# Patient Record
Sex: FEMALE | Race: WHITE | NOT HISPANIC OR LATINO | Employment: UNEMPLOYED | URBAN - METROPOLITAN AREA
[De-identification: names, ages, dates, MRNs, and addresses within clinical notes are randomized per-mention and may not be internally consistent; named-entity substitution may affect disease eponyms.]

---

## 2022-11-09 ENCOUNTER — HOSPITAL ENCOUNTER (EMERGENCY)
Facility: HOSPITAL | Age: 47
Discharge: HOME | End: 2022-11-09
Attending: EMERGENCY MEDICINE
Payer: MEDICAID

## 2022-11-09 VITALS
RESPIRATION RATE: 18 BRPM | WEIGHT: 121.38 LBS | BODY MASS INDEX: 20.22 KG/M2 | HEIGHT: 65 IN | OXYGEN SATURATION: 100 % | SYSTOLIC BLOOD PRESSURE: 136 MMHG | TEMPERATURE: 98 F | DIASTOLIC BLOOD PRESSURE: 90 MMHG | HEART RATE: 88 BPM

## 2022-11-09 DIAGNOSIS — N70.11 HYDROSALPINX: ICD-10-CM

## 2022-11-09 DIAGNOSIS — R63.4 WEIGHT LOSS, ABNORMAL: ICD-10-CM

## 2022-11-09 DIAGNOSIS — R63.4 WEIGHT LOSS, UNINTENTIONAL: ICD-10-CM

## 2022-11-09 DIAGNOSIS — R73.9 HYPERGLYCEMIA: ICD-10-CM

## 2022-11-09 DIAGNOSIS — E11.69 TYPE 2 DIABETES MELLITUS WITH OTHER SPECIFIED COMPLICATION, WITHOUT LONG-TERM CURRENT USE OF INSULIN: ICD-10-CM

## 2022-11-09 DIAGNOSIS — Z91.148 NONCOMPLIANCE WITH MEDICATION REGIMEN: ICD-10-CM

## 2022-11-09 DIAGNOSIS — I10 HYPERTENSION, UNSPECIFIED TYPE: ICD-10-CM

## 2022-11-09 DIAGNOSIS — R63.4 WEIGHT LOSS: Primary | ICD-10-CM

## 2022-11-09 LAB
ALBUMIN SERPL-MCNC: 4 GM/DL (ref 3.5–5)
ALBUMIN/GLOB SERPL: 1.1 RATIO (ref 1.1–2)
ALP SERPL-CCNC: 88 UNIT/L (ref 40–150)
ALT SERPL-CCNC: 19 UNIT/L (ref 0–55)
AMPHET UR QL SCN: POSITIVE
APPEARANCE UR: CLEAR
APTT PPP: 25.4 SECONDS (ref 23.2–33.7)
AST SERPL-CCNC: 16 UNIT/L (ref 5–34)
BARBITURATE SCN PRESENT UR: NEGATIVE
BASOPHILS # BLD AUTO: 0.07 X10(3)/MCL (ref 0–0.2)
BASOPHILS NFR BLD AUTO: 0.9 %
BENZODIAZ UR QL SCN: NEGATIVE
BILIRUB UR QL STRIP.AUTO: NEGATIVE MG/DL
BILIRUBIN DIRECT+TOT PNL SERPL-MCNC: 1 MG/DL
BUN SERPL-MCNC: 17 MG/DL (ref 7–18.7)
CALCIUM SERPL-MCNC: 9.8 MG/DL (ref 8.4–10.2)
CANNABINOIDS UR QL SCN: NEGATIVE
CHLORIDE SERPL-SCNC: 97 MMOL/L (ref 98–107)
CO2 SERPL-SCNC: 29 MMOL/L (ref 22–29)
COCAINE UR QL SCN: NEGATIVE
COLOR UR AUTO: YELLOW
CREAT SERPL-MCNC: 0.8 MG/DL (ref 0.55–1.02)
EOSINOPHIL # BLD AUTO: 0.25 X10(3)/MCL (ref 0–0.9)
EOSINOPHIL NFR BLD AUTO: 3.2 %
ERYTHROCYTE [DISTWIDTH] IN BLOOD BY AUTOMATED COUNT: 11.9 % (ref 11.5–17)
ETHANOL SERPL-MCNC: <10 MG/DL
FENTANYL UR QL SCN: NEGATIVE
GFR SERPLBLD CREATININE-BSD FMLA CKD-EPI: >60 MLS/MIN/1.73/M2
GLOBULIN SER-MCNC: 3.5 GM/DL (ref 2.4–3.5)
GLUCOSE SERPL-MCNC: 326 MG/DL (ref 74–100)
GLUCOSE UR QL STRIP.AUTO: >=1000 MG/DL
HCT VFR BLD AUTO: 39.2 % (ref 37–47)
HGB BLD-MCNC: 13.3 GM/DL (ref 12–16)
IMM GRANULOCYTES # BLD AUTO: 0.01 X10(3)/MCL (ref 0–0.04)
IMM GRANULOCYTES NFR BLD AUTO: 0.1 %
INR BLD: 0.95 (ref 0–1.3)
KETONES UR QL STRIP.AUTO: NEGATIVE MG/DL
LACTATE SERPL-SCNC: 1.8 MMOL/L (ref 0.5–2.2)
LEUKOCYTE ESTERASE UR QL STRIP.AUTO: NEGATIVE UNIT/L
LIPASE SERPL-CCNC: 19 U/L
LYMPHOCYTES # BLD AUTO: 2.51 X10(3)/MCL (ref 0.6–4.6)
LYMPHOCYTES NFR BLD AUTO: 31.7 %
MAGNESIUM SERPL-MCNC: 1.6 MG/DL (ref 1.6–2.6)
MCH RBC QN AUTO: 29.4 PG (ref 27–31)
MCHC RBC AUTO-ENTMCNC: 33.9 MG/DL (ref 33–36)
MCV RBC AUTO: 86.5 FL (ref 80–94)
MDMA UR QL SCN: NEGATIVE
MONOCYTES # BLD AUTO: 0.86 X10(3)/MCL (ref 0.1–1.3)
MONOCYTES NFR BLD AUTO: 10.9 %
NEUTROPHILS # BLD AUTO: 4.2 X10(3)/MCL (ref 2.1–9.2)
NEUTROPHILS NFR BLD AUTO: 53.2 %
NITRITE UR QL STRIP.AUTO: NEGATIVE
OPIATES UR QL SCN: NEGATIVE
PCP UR QL: NEGATIVE
PH UR STRIP.AUTO: 6.5 [PH]
PH UR: 6.5 [PH] (ref 3–11)
PLATELET # BLD AUTO: 298 X10(3)/MCL (ref 130–400)
PMV BLD AUTO: 11 FL (ref 7.4–10.4)
POTASSIUM SERPL-SCNC: 4 MMOL/L (ref 3.5–5.1)
PROT SERPL-MCNC: 7.5 GM/DL (ref 6.4–8.3)
PROT UR QL STRIP.AUTO: NEGATIVE MG/DL
PROTHROMBIN TIME: 12.6 SECONDS (ref 12.5–14.5)
RBC # BLD AUTO: 4.53 X10(6)/MCL (ref 4.2–5.4)
RBC UR QL AUTO: NEGATIVE UNIT/L
SODIUM SERPL-SCNC: 133 MMOL/L (ref 136–145)
SP GR UR STRIP.AUTO: 1.02
SPECIFIC GRAVITY, URINE AUTO (.000) (OHS): 1.02 (ref 1–1.03)
TSH SERPL-ACNC: 0.87 UIU/ML (ref 0.35–4.94)
UROBILINOGEN UR STRIP-ACNC: 1 MG/DL
WBC # SPEC AUTO: 7.9 X10(3)/MCL (ref 4.5–11.5)

## 2022-11-09 PROCEDURE — 85610 PROTHROMBIN TIME: CPT | Performed by: EMERGENCY MEDICINE

## 2022-11-09 PROCEDURE — 93005 ELECTROCARDIOGRAM TRACING: CPT

## 2022-11-09 PROCEDURE — 82077 ASSAY SPEC XCP UR&BREATH IA: CPT | Performed by: EMERGENCY MEDICINE

## 2022-11-09 PROCEDURE — 96361 HYDRATE IV INFUSION ADD-ON: CPT

## 2022-11-09 PROCEDURE — 81003 URINALYSIS AUTO W/O SCOPE: CPT | Mod: 59 | Performed by: EMERGENCY MEDICINE

## 2022-11-09 PROCEDURE — 93010 EKG 12-LEAD: ICD-10-PCS | Mod: ,,, | Performed by: INTERNAL MEDICINE

## 2022-11-09 PROCEDURE — 84443 ASSAY THYROID STIM HORMONE: CPT | Performed by: EMERGENCY MEDICINE

## 2022-11-09 PROCEDURE — 80307 DRUG TEST PRSMV CHEM ANLYZR: CPT | Performed by: EMERGENCY MEDICINE

## 2022-11-09 PROCEDURE — 83735 ASSAY OF MAGNESIUM: CPT | Performed by: EMERGENCY MEDICINE

## 2022-11-09 PROCEDURE — 80053 COMPREHEN METABOLIC PANEL: CPT | Performed by: EMERGENCY MEDICINE

## 2022-11-09 PROCEDURE — 25500020 PHARM REV CODE 255: Performed by: EMERGENCY MEDICINE

## 2022-11-09 PROCEDURE — 83605 ASSAY OF LACTIC ACID: CPT | Performed by: EMERGENCY MEDICINE

## 2022-11-09 PROCEDURE — 99285 EMERGENCY DEPT VISIT HI MDM: CPT | Mod: 25

## 2022-11-09 PROCEDURE — 85025 COMPLETE CBC W/AUTO DIFF WBC: CPT | Performed by: EMERGENCY MEDICINE

## 2022-11-09 PROCEDURE — 83690 ASSAY OF LIPASE: CPT | Performed by: EMERGENCY MEDICINE

## 2022-11-09 PROCEDURE — 87040 BLOOD CULTURE FOR BACTERIA: CPT | Performed by: EMERGENCY MEDICINE

## 2022-11-09 PROCEDURE — 85730 THROMBOPLASTIN TIME PARTIAL: CPT | Performed by: EMERGENCY MEDICINE

## 2022-11-09 PROCEDURE — 93010 ELECTROCARDIOGRAM REPORT: CPT | Mod: ,,, | Performed by: INTERNAL MEDICINE

## 2022-11-09 PROCEDURE — 25000003 PHARM REV CODE 250: Performed by: EMERGENCY MEDICINE

## 2022-11-09 PROCEDURE — 96374 THER/PROPH/DIAG INJ IV PUSH: CPT

## 2022-11-09 RX ORDER — SODIUM CHLORIDE 9 MG/ML
125 INJECTION, SOLUTION INTRAVENOUS ONCE
Status: COMPLETED | OUTPATIENT
Start: 2022-11-09 | End: 2022-11-09

## 2022-11-09 RX ORDER — LISINOPRIL 10 MG/1
10 TABLET ORAL DAILY
Qty: 30 TABLET | Refills: 0 | Status: SHIPPED | OUTPATIENT
Start: 2022-11-09 | End: 2022-12-09

## 2022-11-09 RX ORDER — LABETALOL HYDROCHLORIDE 5 MG/ML
20 INJECTION, SOLUTION INTRAVENOUS
Status: COMPLETED | OUTPATIENT
Start: 2022-11-09 | End: 2022-11-09

## 2022-11-09 RX ORDER — METFORMIN HYDROCHLORIDE 500 MG/1
500 TABLET ORAL 2 TIMES DAILY WITH MEALS
Qty: 60 TABLET | Refills: 0 | Status: SHIPPED | OUTPATIENT
Start: 2022-11-09 | End: 2022-12-09

## 2022-11-09 RX ORDER — GABAPENTIN 300 MG/1
300 CAPSULE ORAL 3 TIMES DAILY
Qty: 90 CAPSULE | Refills: 0 | Status: SHIPPED | OUTPATIENT
Start: 2022-11-09 | End: 2022-12-09

## 2022-11-09 RX ADMIN — IOPAMIDOL 100 ML: 755 INJECTION, SOLUTION INTRAVENOUS at 10:11

## 2022-11-09 RX ADMIN — SODIUM CHLORIDE 125 ML/HR: 9 INJECTION, SOLUTION INTRAVENOUS at 09:11

## 2022-11-09 RX ADMIN — LABETALOL HYDROCHLORIDE 20 MG: 5 INJECTION, SOLUTION INTRAVENOUS at 01:11

## 2022-11-09 NOTE — DISCHARGE INSTRUCTIONS
Please find primary healthcare source.  Re-evaluation before your month worth of medication runs out    Stop smoking    Return for any emergency issue

## 2022-11-09 NOTE — ED PROVIDER NOTES
Encounter Date: 2022       History     Chief Complaint   Patient presents with    Weight Loss     C.o weight loss, lump to epigastric area, left hand and right groin. States had not been to a dr because she is scared to find out what is wrong. Also c/o back/rib pain for 3 weeks with no injury     Is a 47-year-old female who presents the emergency department complaining of massive weight loss over the past year.  She tells us used to weigh 170 lb and then when she weighed 2 weeks ago she was down to 89 lb.  She is a long-time smoker.  She does not drink alcohol she done use drugs.  She has been diagnosed in the past with the need for hormone replacement very therapy hypercholesterolemia they need to be treated hypertension as well as diabetes mellitus.  She has peripheral neuropathy.  About 3 years ago she moved could not find a doctor so stop taking all of her medications.  She arrives the emergency department today because she is having back pain abdominal pain and she is very concerned now about this and massive weight loss.      Past medical history hypertension diabetes on hormone replacement therapy hypercholesterolemia.  Peripheral neuropathy secondary to diabetes.    Vaccinations no COVID no flu no tetanus in the last 5 years no pneumonia shots.      Past surgical history right wrist fracture  x2 tonsillectomy adenoidectomy right foot surgery hysterectomy surgery.  She is a  2 para 2 status post hysterectomy she has no primary healthcare provider currently unemployed.  She is  lives with her .  Her sister brings to the emergency department today mom is alive she has hypertension and kidney issues.  Dad is alive with strokes diabetes and heart disease.  Patient has no known drug allergies    Patient complains of a pain and a mass that she feels at her xiphoid process area that radiates all the way around both ribs and ends up in the middle portion of her back.  Not associated  with fever chills reproducible by pressing on it.  Complains severe pain the middle portion of her back denies any incontinence of bowel or bladder denies any new neuropathy but has bad pain in both legs    Review of patient's allergies indicates:  No Known Allergies  Past Medical History:   Diagnosis Date    Depression     Diabetes mellitus     Hypertension     Mixed hyperlipidemia     Neuropathy      Past Surgical History:   Procedure Laterality Date     SECTION      X2    FOOT SURGERY Left     HYSTERECTOMY      TONSILLECTOMY      WRIST SURGERY Right      History reviewed. No pertinent family history.  Social History     Tobacco Use    Smoking status: Every Day     Packs/day: 1.00     Types: Cigarettes    Smokeless tobacco: Never   Substance Use Topics    Alcohol use: Not Currently    Drug use: Never     Review of Systems   Constitutional:  Positive for appetite change and unexpected weight change (A year ago she said she weighed 170 lb 2 weeks ago she said she weight 89 lb actual weight today in our  lb). Negative for fever.   HENT:  Positive for congestion and drooling. Negative for sore throat.    Eyes:  Positive for discharge.   Respiratory:  Positive for chest tightness. Negative for shortness of breath.    Cardiovascular:  Negative for chest pain.   Gastrointestinal:  Positive for abdominal pain. Negative for nausea, rectal pain and vomiting.   Endocrine: Negative.    Genitourinary:  Negative for difficulty urinating and dysuria.   Musculoskeletal:  Positive for back pain.   Skin:  Negative for rash.   Allergic/Immunologic: Negative.    Neurological: Negative.  Negative for weakness.   Hematological:  Does not bruise/bleed easily.   Psychiatric/Behavioral: Negative.       Physical Exam     Initial Vitals [22 0826]   BP Pulse Resp Temp SpO2   (!) 133/90 (!) 119 20 98.2 °F (36.8 °C) 100 %      MAP       --         Physical Exam    Nursing note and vitals reviewed.  Constitutional: She  appears well-developed and well-nourished.   HENT:   Head: Normocephalic and atraumatic.   Right Ear: Tympanic membrane and external ear normal.   Left Ear: Tympanic membrane and external ear normal.   Nose: Nose normal.   Mouth/Throat: Oropharynx is clear and moist and mucous membranes are normal.   Eyes: EOM are normal. Pupils are equal, round, and reactive to light.   Neck: Neck supple. No thyromegaly present. No tracheal deviation present. No JVD present.   Normal range of motion.  Cardiovascular:  Normal rate, regular rhythm and normal heart sounds.     Exam reveals no gallop and no friction rub.       No murmur heard.  Pulmonary/Chest: Breath sounds normal. No stridor. No respiratory distress. She has no wheezes. She has no rhonchi. She has no rales. She exhibits no tenderness.   Abdominal: Abdomen is soft. Bowel sounds are normal. She exhibits no distension and no mass. There is no abdominal tenderness.   I do not feel any discrete masses in her abdomen.  There is some shotty inguinal adenopathy   Genitourinary:    Genitourinary Comments: No CVA tenderness     Musculoskeletal:         General: No tenderness or edema. Normal range of motion.      Cervical back: Normal range of motion and neck supple.     Lymphadenopathy:     She has no cervical adenopathy.   Neurological: She is alert and oriented to person, place, and time. She has normal strength and normal reflexes. No cranial nerve deficit. GCS score is 15. GCS eye subscore is 4. GCS verbal subscore is 5. GCS motor subscore is 6.   Skin: Skin is warm and dry. Capillary refill takes 2 to 3 seconds. No rash noted.   Psychiatric: She has a normal mood and affect. Her behavior is normal. Judgment and thought content normal.       ED Course   Procedures  Labs Reviewed   COMPREHENSIVE METABOLIC PANEL - Abnormal; Notable for the following components:       Result Value    Sodium Level 133 (*)     Chloride 97 (*)     Glucose Level 326 (*)     All other components  within normal limits   URINALYSIS, REFLEX TO URINE CULTURE - Abnormal; Notable for the following components:    Glucose, UA >=1000 (*)     All other components within normal limits   DRUG SCREEN, URINE (BEAKER) - Abnormal; Notable for the following components:    Amphetamines, Urine Positive (*)     All other components within normal limits    Narrative:     Cut off concentrations:    Amphetamines - 1000 ng/ml  Barbiturates - 200 ng/ml  Benzodiazepine - 200 ng/ml  Cannabinoids (THC) - 50 ng/ml  Cocaine - 300 ng/ml  Fentanyl - 1.0 ng/ml  MDMA - 500 ng/ml  Opiates - 300 ng/ml   Phencyclidine (PCP) - 25 ng/ml    Specimen submitted for drug analysis and tested for pH and specific gravity in order to evaluate sample integrity. Suspect tampering if specific gravity is <1.003 and/or pH is not within the range of 4.5 - 8.0  False negatives may result form substances such as bleach added to urine.  False positives may result for the presence of a substance with similar chemical structure to the drug or its metabolite.    This test provides only a PRELIMINARY analytical test result. A more specific alternate chemical method must be used in order to obtain a confirmed analytical result. Gas chromatography/mass spectrometry (GC/MS) is the preferred confirmatory method. Other chemical confirmation methods are available. Clinical consideration and professional judgement should be applied to any drug of abuse test result, particularly when preliminary positive results are used.    Positive results will be confirmed only at the physicians request. Unconfirmed screening results are to be used only for medical purposes (treatment).        CBC WITH DIFFERENTIAL - Abnormal; Notable for the following components:    MPV 11.0 (*)     All other components within normal limits   ALCOHOL,MEDICAL (ETHANOL) - Normal   LACTIC ACID, PLASMA - Normal   PROTIME-INR - Normal   APTT - Normal   TSH - Normal   LIPASE - Normal   MAGNESIUM - Normal    BLOOD CULTURE OLG   BLOOD CULTURE [facility]LG   CBC W/ AUTO DIFFERENTIAL    Narrative:     The following orders were created for panel order CBC auto differential.  Procedure                               Abnormality         Status                     ---------                               -----------         ------                     CBC with Differential[[id number]]        Abnormal            Final result                 Please view results for these tests on the individual orders.     Recent Results (from the past 8 hour(s))   Comprehensive metabolic panel    Collection Time: 11/09/22  8:50 AM   Result Value Ref Range    Sodium Level 133 (L) 136 - 145 mmol/L    Potassium Level 4.0 3.5 - 5.1 mmol/L    Chloride 97 (L) 98 - 107 mmol/L    Carbon Dioxide 29 22 - 29 mmol/L    Glucose Level 326 (H) 74 - 100 mg/dL    Blood Urea Nitrogen 17.0 7.0 - 18.7 mg/dL    Creatinine 0.80 0.55 - 1.02 mg/dL    Calcium Level Total 9.8 8.4 - 10.2 mg/dL    Protein Total 7.5 6.4 - 8.3 gm/dL    Albumin Level 4.0 3.5 - 5.0 gm/dL    Globulin 3.5 2.4 - 3.5 gm/dL    Albumin/Globulin Ratio 1.1 1.1 - 2.0 ratio    Bilirubin Total 1.0 <=1.5 mg/dL    Alkaline Phosphatase 88 40 - 150 unit/L    Alanine Aminotransferase 19 0 - 55 unit/L    Aspartate Aminotransferase 16 5 - 34 unit/L    eGFR >60 mls/min/1.73/m2   Ethanol    Collection Time: 11/09/22  8:50 AM   Result Value Ref Range    Ethanol Level <10.0 <=10.0 mg/dL   Lactic acid, plasma    Collection Time: 11/09/22  8:50 AM   Result Value Ref Range    Lactic Acid Level 1.8 0.5 - 2.2 mmol/L   Protime-INR    Collection Time: 11/09/22  8:50 AM   Result Value Ref Range    PT 12.6 12.5 - 14.5 seconds    INR 0.95 0.00 - 1.30   APTT    Collection Time: 11/09/22  8:50 AM   Result Value Ref Range    PTT 25.4 23.2 - 33.7 seconds   TSH    Collection Time: 11/09/22  8:50 AM   Result Value Ref Range    Thyroid Stimulating Hormone 0.8711 0.3500 - 4.9400 uIU/mL   Lipase    Collection Time: 11/09/22  8:50 AM   Result  Value Ref Range    Lipase Level 19 <=60 U/L   Magnesium    Collection Time: 11/09/22  8:50 AM   Result Value Ref Range    Magnesium Level 1.60 1.60 - 2.60 mg/dL   CBC with Differential    Collection Time: 11/09/22  8:50 AM   Result Value Ref Range    WBC 7.9 4.5 - 11.5 x10(3)/mcL    RBC 4.53 4.20 - 5.40 x10(6)/mcL    Hgb 13.3 12.0 - 16.0 gm/dL    Hct 39.2 37.0 - 47.0 %    MCV 86.5 80.0 - 94.0 fL    MCH 29.4 27.0 - 31.0 pg    MCHC 33.9 33.0 - 36.0 mg/dL    RDW 11.9 11.5 - 17.0 %    Platelet 298 130 - 400 x10(3)/mcL    MPV 11.0 (H) 7.4 - 10.4 fL    Neut % 53.2 %    Lymph % 31.7 %    Mono % 10.9 %    Eos % 3.2 %    Basophil % 0.9 %    Lymph # 2.51 0.6 - 4.6 x10(3)/mcL    Neut # 4.2 2.1 - 9.2 x10(3)/mcL    Mono # 0.86 0.1 - 1.3 x10(3)/mcL    Eos # 0.25 0 - 0.9 x10(3)/mcL    Baso # 0.07 0 - 0.2 x10(3)/mcL    IG# 0.01 0 - 0.04 x10(3)/mcL    IG% 0.1 %   Urinalysis, Reflex to Urine Culture Urine, Clean Catch    Collection Time: 11/09/22 10:14 AM    Specimen: Urine   Result Value Ref Range    Color, UA Yellow Yellow, Light-Yellow, Dark Yellow, Silvina, Straw    Appearance, UA Clear Clear    Specific Gravity, UA 1.020     pH, UA 6.5 5.0 - 8.5    Protein, UA Negative Negative mg/dL    Glucose, UA >=1000 (A) Negative, Normal mg/dL    Ketones, UA Negative Negative mg/dL    Blood, UA Negative Negative unit/L    Bilirubin, UA Negative Negative mg/dL    Urobilinogen, UA 1.0 0.2, 1.0, Normal mg/dL    Nitrites, UA Negative Negative    Leukocyte Esterase, UA Negative Negative unit/L   Drug Screen panel, emergency    Collection Time: 11/09/22 10:14 AM   Result Value Ref Range    Amphetamines, Urine Positive (A) Negative    Barbituates, Urine Negative Negative    Benzodiazepine, Urine Negative Negative    Cannabinoids, Urine Negative Negative    Cocaine, Urine Negative Negative    Fentanyl, Urine Negative Negative    MDMA, Urine Negative Negative    Opiates, Urine Negative Negative    Phencyclidine, Urine Negative Negative    pH, Urine  6.5 3.0 - 11.0    Specific Gravity, Urine Auto 1.020 1.001 - 1.035       EKG Readings: (Independently Interpreted)   Initial Reading: No STEMI. Rhythm: Sinus Tachycardia. Heart Rate: 114.   0843. 9 November 2022.  Heart rate 114 sinus tachycardia biatrial atrial enlargement aside from the tachycardia fairly benign normal appearing ECG   ECG Results              EKG 12-lead (Final result)  Result time 11/09/22 12:11:05      Final result by Interface, Lab In [facility] (11/09/22 12:11:05)                   Narrative:    Test Reason : R63.4,    Vent. Rate : 114 BPM     Atrial Rate : 114 BPM     P-R Int : 118 ms          QRS Dur : 076 ms      QT Int : 324 ms       P-R-T Axes : 071 048 079 degrees     QTc Int : 446 ms    Sinus tachycardia  Biatrial enlargement  Abnormal ECG  No previous ECGs available  Confirmed by Shaun Dela Cruz MD ([id number]) on 11/9/2022 12:10:58 PM    Referred By: [email or web address]   SELF           Confirmed By:Shaun Dela Cruz MD                                  Imaging Results              US Pelvis Complete Non OB (Final result)  Result time 11/09/22 14:20:31   Procedure changed from US Transvaginal Non OB     Final result by Kvng Curry MD (11/09/22 14:20:31)                   Impression:      1. Status post hysterectomy.  Previous suspect uterus seen on the CT exam of the same day likely represents a prominent residual cervical stump.  2. Tubular cystic 4.2 x 1.3 x 2.4 cm focus superior to the right ovary suspicious for a hydro or pyosalpinx      Electronically signed by: Kvng Curry  Date:    11/09/2022  Time:    14:20               Narrative:    EXAMINATION:  ULTRASOUND PELVIS NON OB COMPLETE:    CLINICAL HISTORY:  Chronic salpingitis, hydrosalpinx;    COMPARISON:  None available    FINDINGS:  Transabdominal  scanning was performed. Multiple sonographic images reveal the uterus to be surgically absent.  The right ovary measures 2.8 x 2.2 x 2.4 cm and demonstrates venous flow.  The left ovary measures 3.0  x 1.9 x 2.2 cm and demonstrates venous flow a tubular cystic like focus is noted superior to the right ovary measuring 4.2 x 1.3 x 2.4 cm of uncertain etiology.  This may represent a hydro or pyosalpinx..  No free fluid is seen within the cul-de-sac. No abnormal adnexal mass is seen.                                       CT Chest Abdomen Pelvis With Contrast (xpd) (Final result)  Result time 11/09/22 11:51:22      Final result by Kvng Curry MD (11/09/22 11:51:22)                   Impression:      1. 3 mm subpleural nodule anterior right middle lobe and 2 mm pleural base nodule lateral left lung base.  Follow-up per Fleischner criteria  2. Findings compatible with a 1 cm simple cyst at the mid left kidney  3. Somewhat serpiginous tubular fluid density in the right adnexa measuring up to 1 cm in diameter and 5 cm in length suspicious for a hydrosalpinx.  A pelvic sonogram would allow further evaluation  4. A few blebs of gas present within the bladder and in the vaginal cavity.  Clinical correlation is indicated  5. 2 cm nodule left adrenal gland.  MR examination would allow further evaluation.  6. Mild thoracolumbar spondylosis with 5 mm sclerotic focus at T7  7. A few mildly prominent ill-defined lymph nodes measuring 1 cm seen within the periportal/pericaval region.      Electronically signed by: Kvng Curry  Date:    11/09/2022  Time:    11:51               Narrative:    EXAMINATION:  CT CHEST ABDOMEN PELVIS WITH CONTRAST (XPD)    CLINICAL HISTORY:  Weight loss, unintended;, .    TECHNIQUE:  PATIENT RADIATION DOSE: DLP(mGycm) 353    As per PQRS measures, all CT scans at this facility used dose modulation, iterative reconstruction, and/or weight based dose adjustment when appropriate to reduce radiation dose to as low as reasonably achievable.    COMPARISON:  None available    FINDINGS:  Serial axial images were obtained of the chest abdomen pelviswith the administration of IV contrast.  Additional  sagittal and coronal reconstructions were performed. Mild degenerative changes are noted to the thoracolumbar spine.  A small round sclerotic focus is evident at T7 measuring 5 mm in diameter.  Visualized thyroid lobes are relatively symmetric in size.  No mediastinal or axillary lymphadenopathy is seen.  The heart is normal in size.  The airways are grossly patent.  The lungs are relatively clear and well aerated.  No infiltrate or effusion is seen.  A 3 mm subpleural nodule is evident at the anterolateral right middle lobe.  A 2 mm pleural based nodule is evident at the lateral left lung base.  The liver, gallbladder, spleen, right adrenal gland, and pancreas are grossly within normal limits.  Atherosclerosis is seen within the aorta and branching vessels.  The stomach is partially distended with fluid and particulate food matter.  A likely splenic accessory nodule measuring just over 1 cm is seen at the splenic hilum.  There is nodular thickening at the left adrenal gland measuring up to 2 cm in diameter.  The kidneys are relatively symmetric in size.  No hydronephrosis is seen.  A small round low-attenuation focus/suspect cyst measuring 1 cm is noted to the posterior mid left kidney.  A few mildly prominent ill-defined lymph nodes seen within the pericaval/periportal region measuring just over 1 cm.  No dilated loops of bowel are identified.  No free fluid collection is seen.  Feces is scattered throughout the colon.  The appendix is not identified with certainty.  Evaluation is limited.  The bladder is distended with fluid.  A few blebs of gas are seen within the anterior bladder.  There is a small amount of gas present within the vaginal cavity.  The uterus is normal in size.  There is mild ill-defined soft tissue fullness at the cervical vaginal region.  A somewhat serpiginous tubular fluid density is seen at the right adnexa measuring up to 1 cm in diameter and up to 5 cm in length.                                        X-Ray Chest PA And Lateral (Final result)  Result time 11/09/22 10:18:38      Final result by Kvng Curry MD (11/09/22 10:18:38)                   Impression:      1. No active cardiopulmonary disease identified      Electronically signed by: Kvng Curry  Date:    11/09/2022  Time:    10:18               Narrative:    EXAMINATION:  XR CHEST PA AND LATERAL    CLINICAL HISTORY:  Chest Pain;, .    COMPARISON:  05/09/2019    FINDINGS:  PA/AP and lateral views reveal heart to be normal in size.  The trachea is midline.  No infiltrate or effusion is seen.  Bony structures appear grossly intact.  No pneumothorax is identified.                                       Medications   0.9%  NaCl infusion (0 mL/hr Intravenous Stopped 11/9/22 1445)   iopamidoL (ISOVUE-370) injection 100 mL (100 mLs Intravenous Given 11/9/22 1056)   labetaloL injection 20 mg (20 mg Intravenous Given 11/9/22 1338)     Medical Decision Making:   Initial Assessment:   Unexplained weight loss in a smoker certainly occult malignancy or undiagnosed malignancy would be highly likely.  Also noncompliant with medications for 3 years diabetes could of taking his told also.  Patient says that her weight was 170 and she went down to 89 last weight 2 or 3 weeks ago she states but here she weighed 121 lb.  Differential Diagnosis:   Cancer, untreated diabetes and hypertension, unexplained weight loss patient does suffer with chronic neuropathy significant depression can cause this also           ED Course as of 11/09/22 1525   Wed Nov 09, 2022   1236 With her sister in the room with her permission we discussed all of her results drug screen was positive for amphetamines in the urine but she denies using Ritalin or taking prescription Ritalin she denies any methamphetamine use.  I explained the about the hydrosalpinx.  The need for the ultrasound.  I also talked to them about how the CT scan chest abdomen pelvis did not disclose any obvious  cancerous looking lesions.  She asked that me she does not neck cancer said I cannot guarantee that we did not find any evidence of any cancer on your scans.    There was also a big discrepancy between what she said she lost and the documented weight loss she says that a year ago she weighed 170 lb and she said 2 weeks ago she was 98 lb our weight here on a scale was 121. [DM]   1356 Neut #: 4.2 [DM]   1426 I explained to the patient that in the right side she does have that fluid in what appears to be the fallopian tube she does not have an elevated white count does not appear to be a gross infection clinically at this point her tenderness is more on the left side of her abdomen I believe she can follow up with a doctor regarding this and maybe even get referred to a gyn doctor    Also explained to her we will put her back on some of her medications lisinopril for her blood pressure.  As well as metformin for her diabetes.  We will give her gabapentin for her neuropathy. [DM]      ED Course User Index  [DM] Kvng Pacheco MD                 Clinical Impression:   Final diagnoses:  [R63.4] Weight loss, abnormal  [N70.11] Hydrosalpinx  [R63.4] Weight loss (Primary)  [R73.9] Hyperglycemia  [R63.4] Weight loss, unintentional  [E11.69] Type 2 diabetes mellitus with other specified complication, without long-term current use of insulin  [I10] Hypertension, unspecified type  [Z91.14] Noncompliance with medication regimen        ED Disposition Condition    Discharge Stable          ED Prescriptions       Medication Sig Dispense Start Date End Date Auth. Provider    gabapentin (NEURONTIN) 300 MG capsule Take 1 capsule (300 mg total) by mouth 3 (three) times daily. 90 capsule 11/9/2022 12/9/2022 Kvng Pacheco MD    metFORMIN (GLUCOPHAGE) 500 MG tablet Take 1 tablet (500 mg total) by mouth 2 (two) times daily with meals. 60 tablet 11/9/2022 12/9/2022 Kvng Pacheco MD    lisinopriL 10 MG tablet Take 1 tablet (10 mg  total) by mouth once daily. 30 tablet 11/9/2022 12/9/2022 Kvng Pacheco MD          Follow-up Information    None          Kvng Pacheco MD  11/09/22 0160

## 2022-11-14 LAB
BACTERIA BLD CULT: NORMAL
BACTERIA BLD CULT: NORMAL

## 2022-11-21 ENCOUNTER — PATIENT MESSAGE (OUTPATIENT)
Dept: ADMINISTRATIVE | Facility: HOSPITAL | Age: 47
End: 2022-11-21
Payer: MEDICAID

## 2023-05-23 ENCOUNTER — TELEPHONE (OUTPATIENT)
Dept: GYNECOLOGIC ONCOLOGY | Facility: CLINIC | Age: 48
End: 2023-05-23
Payer: MEDICAID

## 2023-05-26 ENCOUNTER — TELEPHONE (OUTPATIENT)
Dept: GYNECOLOGIC ONCOLOGY | Facility: CLINIC | Age: 48
End: 2023-05-26
Payer: MEDICAID

## 2023-05-29 ENCOUNTER — TELEPHONE (OUTPATIENT)
Dept: GYNECOLOGIC ONCOLOGY | Facility: CLINIC | Age: 48
End: 2023-05-29
Payer: MEDICAID

## 2023-05-29 NOTE — PROGRESS NOTES
REFERRING PROVIDER  Lyndsay Roberts MD    HISTORY OF PRESENT CONDITION  Chief complaint: right ovarian mass  Cici Ward is a 47 y.o.  who presents in consultation for an opinion regarding a right ovarian mass with a CA-125 of 6.1.     +PO. -N/V. +Early satiety. +Unintentional weight loss. +Flatus. +BM. -Melena, hematochezia. +Chronic back pain (will not address).  Pelvic pain characterized as sharp.  Intermittent.  Has not progressed.  No alleviating or aggravating factors.  -VB, VD. -Bloating.    REVIEW OF SYSTEMS  All systems reviewed and negative except as noted in HPI.    OBJECTIVE   Vitals:    23 0909   BP: 123/77   Pulse: 91      Body mass index is 21.52 kg/m².      1. General: Well appearing, no apparent distress, alert and oriented.  2. Lymph: Neck symmetric without cervical or supraclavicular adenopathy or mass.  3. Lungs: Normal respiratory rate, no accessory muscle use.  4. Cardiac: Normal rate  5. Psych: Normal affect.  6. Abdomen:  non-distended, soft, non-tender, are no masses, no ascites, no hepatosplenomegaly.  7. Skin: Warm, dry, no rashes or lesions.   8. Extremities: Bilateral lower extremities without edema or tenderness.  9. Genitourinary               Pelvic Examination including:                a. External genitalia are normal in appearance. No lesions noted.               b. Urethral meatus is normal size, location, and appearance.               c. Urethra is negative.               d. Bladder is nontender. No masses noted.               e. Vagina has normal mucosa with physiologic discharge. No lesions noted.              f. Uterus with a midline cervix that is mobile              g. Adnexa normal and mobile   h. Rectum normal mucosa    ECOG status: 1    LABORATORY DATA  Lab data reviewed.    RADIOLOGICAL DATA  Radiology data reviewed.    PATHOLOGY DATA  Pathology data reviewed.    ASSESSMENT / PLAN     1. Adnexal mass    2. Chronic pain syndrome    3. Type 2 diabetes mellitus  with diabetic polyneuropathy, without long-term current use of insulin         F/U PCP (chronic back pain and DM2)  F/U operative report (2007)  VV 1 month    PSH:  Laparoscopic supracervical hysterectomy  Dar C/S x2    4/24/23: CA-125 = 6.1, HE-4 = 87, ESTEFANY = 2.21 (normal < 1.07)  4/12/23: Pelvic US: R complex adnexal mass, 4 cm (looks like a hydrosalpinx)  4/6/23: Pap: NIL, HPV-  4/3/23: Breast US: BI-RADS3 (probably benign)  3/22/23: HbA1c = 10.5  3/27/23: Mammogram: BI-RADS0 (inconclusive)   11/9/22: Pelvic US: R complex adnexal mass, 4 cm     CT C/A/P w/: -Ascites, omental caking, lymphadenopathy.    We reviewed her imaging and blood work.  This does not seem consistent with an ovarian cancer.  It is also extremely unlikely that her adnexal mass is the cause of her unintentional weight loss, fatigue, and early satiety.  Before proceeding with surgery she will need to optimize her uncontrolled DM and chronic back pain.  We will prescribe her a short course of opiates x 1.  She will need to follow-up with her PCP for refills. I discussed with the patient that the primary treatment for an adnexal mass is observation versus surgical management.  Surgical management will depend on the use of frozen pathology.  Differential diagnoses includes a benign, borderline, or malignant mass.  I will plan to perform this in a robotic fashion.  We will abort her surgery if she has deeply infiltrative and advanced endometriosis.  We will proceed with a trachelectomy if feasible.  The procedure and its risks and benefits were discussed in detail.      PATIENT EDUCATION  Ready to learn, no apparent learning barriers were identified; learning preferences include listening.  Explained diagnosis and treatment plan; patient expressed understanding of the content.    INFORMED CONSENT  Discussed the risks, benefits, and alternatives of the procedure and of possible blood transfusion.  Discussed the necessity of other members of  the healthcare team participating in the procedure.  All questions answered and consent given.      David Carvalho

## 2023-05-30 ENCOUNTER — TELEPHONE (OUTPATIENT)
Dept: GYNECOLOGIC ONCOLOGY | Facility: CLINIC | Age: 48
End: 2023-05-30
Payer: MEDICAID

## 2023-06-02 ENCOUNTER — OFFICE VISIT (OUTPATIENT)
Dept: GYNECOLOGIC ONCOLOGY | Facility: CLINIC | Age: 48
End: 2023-06-02
Payer: MEDICAID

## 2023-06-02 ENCOUNTER — TELEPHONE (OUTPATIENT)
Dept: GYNECOLOGIC ONCOLOGY | Facility: CLINIC | Age: 48
End: 2023-06-02

## 2023-06-02 VITALS
HEIGHT: 65 IN | SYSTOLIC BLOOD PRESSURE: 123 MMHG | HEART RATE: 91 BPM | DIASTOLIC BLOOD PRESSURE: 77 MMHG | WEIGHT: 129.31 LBS | BODY MASS INDEX: 21.54 KG/M2

## 2023-06-02 DIAGNOSIS — E11.42 TYPE 2 DIABETES MELLITUS WITH DIABETIC POLYNEUROPATHY, WITHOUT LONG-TERM CURRENT USE OF INSULIN: ICD-10-CM

## 2023-06-02 DIAGNOSIS — G89.4 CHRONIC PAIN SYNDROME: ICD-10-CM

## 2023-06-02 DIAGNOSIS — N94.89 ADNEXAL MASS: Primary | ICD-10-CM

## 2023-06-02 PROCEDURE — 3008F BODY MASS INDEX DOCD: CPT | Mod: CPTII,,, | Performed by: OBSTETRICS & GYNECOLOGY

## 2023-06-02 PROCEDURE — 1159F MED LIST DOCD IN RCRD: CPT | Mod: CPTII,,, | Performed by: OBSTETRICS & GYNECOLOGY

## 2023-06-02 PROCEDURE — 3008F PR BODY MASS INDEX (BMI) DOCUMENTED: ICD-10-PCS | Mod: CPTII,,, | Performed by: OBSTETRICS & GYNECOLOGY

## 2023-06-02 PROCEDURE — 99205 OFFICE O/P NEW HI 60 MIN: CPT | Mod: S$PBB,,, | Performed by: OBSTETRICS & GYNECOLOGY

## 2023-06-02 PROCEDURE — 99205 PR OFFICE/OUTPT VISIT, NEW, LEVL V, 60-74 MIN: ICD-10-PCS | Mod: S$PBB,,, | Performed by: OBSTETRICS & GYNECOLOGY

## 2023-06-02 PROCEDURE — 3074F PR MOST RECENT SYSTOLIC BLOOD PRESSURE < 130 MM HG: ICD-10-PCS | Mod: CPTII,,, | Performed by: OBSTETRICS & GYNECOLOGY

## 2023-06-02 PROCEDURE — 1159F PR MEDICATION LIST DOCUMENTED IN MEDICAL RECORD: ICD-10-PCS | Mod: CPTII,,, | Performed by: OBSTETRICS & GYNECOLOGY

## 2023-06-02 PROCEDURE — 99999 PR PBB SHADOW E&M-EST. PATIENT-LVL III: CPT | Mod: PBBFAC,,, | Performed by: OBSTETRICS & GYNECOLOGY

## 2023-06-02 PROCEDURE — 3046F HEMOGLOBIN A1C LEVEL >9.0%: CPT | Mod: CPTII,,, | Performed by: OBSTETRICS & GYNECOLOGY

## 2023-06-02 PROCEDURE — 3078F DIAST BP <80 MM HG: CPT | Mod: CPTII,,, | Performed by: OBSTETRICS & GYNECOLOGY

## 2023-06-02 PROCEDURE — 3078F PR MOST RECENT DIASTOLIC BLOOD PRESSURE < 80 MM HG: ICD-10-PCS | Mod: CPTII,,, | Performed by: OBSTETRICS & GYNECOLOGY

## 2023-06-02 PROCEDURE — 3046F PR MOST RECENT HEMOGLOBIN A1C LEVEL > 9.0%: ICD-10-PCS | Mod: CPTII,,, | Performed by: OBSTETRICS & GYNECOLOGY

## 2023-06-02 PROCEDURE — 99999 PR PBB SHADOW E&M-EST. PATIENT-LVL III: ICD-10-PCS | Mod: PBBFAC,,, | Performed by: OBSTETRICS & GYNECOLOGY

## 2023-06-02 PROCEDURE — 99213 OFFICE O/P EST LOW 20 MIN: CPT | Mod: PBBFAC | Performed by: OBSTETRICS & GYNECOLOGY

## 2023-06-02 PROCEDURE — 3074F SYST BP LT 130 MM HG: CPT | Mod: CPTII,,, | Performed by: OBSTETRICS & GYNECOLOGY

## 2023-06-02 RX ORDER — IBUPROFEN 800 MG/1
800 TABLET ORAL
COMMUNITY
Start: 2023-04-24

## 2023-06-02 RX ORDER — OXYCODONE AND ACETAMINOPHEN 5; 325 MG/1; MG/1
1 TABLET ORAL EVERY 4 HOURS PRN
Qty: 25 TABLET | Refills: 0 | Status: SHIPPED | OUTPATIENT
Start: 2023-06-02

## 2023-06-02 RX ORDER — LACOSAMIDE 100 MG/1
100 TABLET, FILM COATED ORAL
COMMUNITY
Start: 2023-05-22

## 2023-06-02 NOTE — TELEPHONE ENCOUNTER
----- Message from Anya Mascorro sent at 6/2/2023 10:24 AM CDT -----  Regarding: valentino ewing  Type:  Patient Returning Call    Who Called:henrique     Who Left Message for Patient:candance     Does the patient know what this is regarding?:yes     Would the patient rather a call back or a response via [employer]? Call    Best Call Back Number:[phone]    Additional Information:

## 2023-06-02 NOTE — TELEPHONE ENCOUNTER
Returned call to clarify prescription.  Tanika Montenegro, FNP-C, AOCNP  Gynecologic Oncology    ----- Message from Oscar Galvin MA sent at 6/2/2023  3:03 PM CDT -----  Regarding: FW: CAll BAck    ----- Message -----  From: Easton Garcia  Sent: 6/2/2023  11:11 AM CDT  To: Deven Hernandez Staff  Subject: CAll BAck                                        Name of Who is Calling: Jose G [facility]               What is the request in detail: Jose G requesting a call back about medication oxyCODONE-acetaminophen (PERCOCET) 5-325 mg per tablet. Wants to know if the pt has a chronic conditions. Please assist              Can the clinic reply by [facility]: No              What Number to Call Back if not in [facility]: [phone]

## 2023-07-19 ENCOUNTER — TELEPHONE (OUTPATIENT)
Dept: GYNECOLOGIC ONCOLOGY | Facility: CLINIC | Age: 48
End: 2023-07-19
Payer: MEDICAID

## 2023-07-19 ENCOUNTER — PATIENT MESSAGE (OUTPATIENT)
Dept: GYNECOLOGIC ONCOLOGY | Facility: CLINIC | Age: 48
End: 2023-07-19
Payer: MEDICAID

## 2023-07-22 DIAGNOSIS — G89.4 CHRONIC PAIN SYNDROME: ICD-10-CM

## 2023-07-22 DIAGNOSIS — N94.89 ADNEXAL MASS: Primary | ICD-10-CM

## 2023-07-22 DIAGNOSIS — E11.42 TYPE 2 DIABETES MELLITUS WITH DIABETIC POLYNEUROPATHY, WITHOUT LONG-TERM CURRENT USE OF INSULIN: ICD-10-CM

## 2023-07-24 ENCOUNTER — TELEPHONE (OUTPATIENT)
Dept: GYNECOLOGIC ONCOLOGY | Facility: CLINIC | Age: 48
End: 2023-07-24
Payer: MEDICAID

## 2023-07-26 ENCOUNTER — TELEPHONE (OUTPATIENT)
Dept: GYNECOLOGIC ONCOLOGY | Facility: CLINIC | Age: 48
End: 2023-07-26
Payer: MEDICAID

## 2024-07-17 ENCOUNTER — HOSPITAL ENCOUNTER (EMERGENCY)
Facility: HOSPITAL | Age: 49
Discharge: HOME | End: 2024-07-17
Attending: STUDENT IN AN ORGANIZED HEALTH CARE EDUCATION/TRAINING PROGRAM
Payer: COMMERCIAL

## 2024-07-17 VITALS
HEART RATE: 104 BPM | RESPIRATION RATE: 18 BRPM | WEIGHT: 175 LBS | DIASTOLIC BLOOD PRESSURE: 75 MMHG | SYSTOLIC BLOOD PRESSURE: 150 MMHG | BODY MASS INDEX: 29.16 KG/M2 | OXYGEN SATURATION: 99 % | TEMPERATURE: 97 F | HEIGHT: 65 IN

## 2024-07-17 DIAGNOSIS — N30.00 ACUTE CYSTITIS WITHOUT HEMATURIA: ICD-10-CM

## 2024-07-17 DIAGNOSIS — K08.89 PAIN, DENTAL: Primary | ICD-10-CM

## 2024-07-17 DIAGNOSIS — R73.9 HYPERGLYCEMIA: ICD-10-CM

## 2024-07-17 LAB
ANION GAP SERPL CALC-SCNC: 13 MEQ/L
BACTERIA #/AREA URNS AUTO: ABNORMAL /HPF
BASOPHILS # BLD AUTO: 0.04 X10(3)/MCL
BASOPHILS NFR BLD AUTO: 0.5 %
BILIRUB UR QL STRIP.AUTO: NEGATIVE
BUN SERPL-MCNC: 6 MG/DL (ref 7–18.7)
CALCIUM SERPL-MCNC: 9.1 MG/DL (ref 8.4–10.2)
CHLORIDE SERPL-SCNC: 90 MMOL/L (ref 98–107)
CLARITY UR: ABNORMAL
CO2 SERPL-SCNC: 28 MMOL/L (ref 22–29)
COLOR UR AUTO: YELLOW
CREAT SERPL-MCNC: 0.89 MG/DL (ref 0.55–1.02)
CREAT/UREA NIT SERPL: 7
EOSINOPHIL # BLD AUTO: 0.02 X10(3)/MCL (ref 0–0.9)
EOSINOPHIL NFR BLD AUTO: 0.2 %
ERYTHROCYTE [DISTWIDTH] IN BLOOD BY AUTOMATED COUNT: 12.1 % (ref 11.5–17)
FLUAV AG UPPER RESP QL IA.RAPID: NOT DETECTED
FLUBV AG UPPER RESP QL IA.RAPID: NOT DETECTED
GFR SERPLBLD CREATININE-BSD FMLA CKD-EPI: >60 ML/MIN/1.73/M2
GLUCOSE SERPL-MCNC: 366 MG/DL (ref 74–100)
GLUCOSE UR QL STRIP: ABNORMAL
HCT VFR BLD AUTO: 33.4 % (ref 37–47)
HGB BLD-MCNC: 10.9 G/DL (ref 12–16)
HGB UR QL STRIP: ABNORMAL
IMM GRANULOCYTES # BLD AUTO: 0.05 X10(3)/MCL (ref 0–0.04)
IMM GRANULOCYTES NFR BLD AUTO: 0.6 %
KETONES UR QL STRIP: ABNORMAL
LEUKOCYTE ESTERASE UR QL STRIP: ABNORMAL
LYMPHOCYTES # BLD AUTO: 1.49 X10(3)/MCL (ref 0.6–4.6)
LYMPHOCYTES NFR BLD AUTO: 17.6 %
MCH RBC QN AUTO: 27.8 PG (ref 27–31)
MCHC RBC AUTO-ENTMCNC: 32.6 G/DL (ref 33–36)
MCV RBC AUTO: 85.2 FL (ref 80–94)
MONOCYTES # BLD AUTO: 0.71 X10(3)/MCL (ref 0.1–1.3)
MONOCYTES NFR BLD AUTO: 8.4 %
NEUTROPHILS # BLD AUTO: 6.15 X10(3)/MCL (ref 2.1–9.2)
NEUTROPHILS NFR BLD AUTO: 72.7 %
NITRITE UR QL STRIP: NEGATIVE
NRBC BLD AUTO-RTO: 0 %
PH UR STRIP: 6 [PH]
PLATELET # BLD AUTO: 344 X10(3)/MCL (ref 130–400)
PMV BLD AUTO: 10.9 FL (ref 7.4–10.4)
POTASSIUM SERPL-SCNC: 3.3 MMOL/L (ref 3.5–5.1)
PROT UR QL STRIP: NEGATIVE
RBC # BLD AUTO: 3.92 X10(6)/MCL (ref 4.2–5.4)
RBC #/AREA URNS AUTO: ABNORMAL /HPF
SARS-COV-2 RNA RESP QL NAA+PROBE: NOT DETECTED
SODIUM SERPL-SCNC: 131 MMOL/L (ref 136–145)
SP GR UR STRIP.AUTO: 1.01 (ref 1–1.03)
SQUAMOUS #/AREA URNS AUTO: ABNORMAL /HPF
STREP A PCR (OHS): NOT DETECTED
UROBILINOGEN UR STRIP-ACNC: 0.2
WBC # BLD AUTO: 8.46 X10(3)/MCL (ref 4.5–11.5)
WBC #/AREA URNS AUTO: ABNORMAL /HPF
YEAST UR QL AUTO: ABNORMAL /HPF

## 2024-07-17 PROCEDURE — 99284 EMERGENCY DEPT VISIT MOD MDM: CPT

## 2024-07-17 PROCEDURE — 87651 STREP A DNA AMP PROBE: CPT | Performed by: STUDENT IN AN ORGANIZED HEALTH CARE EDUCATION/TRAINING PROGRAM

## 2024-07-17 PROCEDURE — 80048 BASIC METABOLIC PNL TOTAL CA: CPT | Performed by: STUDENT IN AN ORGANIZED HEALTH CARE EDUCATION/TRAINING PROGRAM

## 2024-07-17 PROCEDURE — 81001 URINALYSIS AUTO W/SCOPE: CPT | Performed by: STUDENT IN AN ORGANIZED HEALTH CARE EDUCATION/TRAINING PROGRAM

## 2024-07-17 PROCEDURE — 25000003 PHARM REV CODE 250: Performed by: STUDENT IN AN ORGANIZED HEALTH CARE EDUCATION/TRAINING PROGRAM

## 2024-07-17 PROCEDURE — 87086 URINE CULTURE/COLONY COUNT: CPT | Performed by: STUDENT IN AN ORGANIZED HEALTH CARE EDUCATION/TRAINING PROGRAM

## 2024-07-17 PROCEDURE — 85025 COMPLETE CBC W/AUTO DIFF WBC: CPT | Performed by: STUDENT IN AN ORGANIZED HEALTH CARE EDUCATION/TRAINING PROGRAM

## 2024-07-17 PROCEDURE — 81003 URINALYSIS AUTO W/O SCOPE: CPT | Performed by: STUDENT IN AN ORGANIZED HEALTH CARE EDUCATION/TRAINING PROGRAM

## 2024-07-17 PROCEDURE — 0240U COVID/FLU A&B PCR: CPT | Performed by: STUDENT IN AN ORGANIZED HEALTH CARE EDUCATION/TRAINING PROGRAM

## 2024-07-17 RX ORDER — METFORMIN HYDROCHLORIDE 1000 MG/1
1000 TABLET ORAL 2 TIMES DAILY WITH MEALS
Qty: 60 TABLET | Refills: 0 | Status: SHIPPED | OUTPATIENT
Start: 2024-07-17 | End: 2024-08-16

## 2024-07-17 RX ORDER — AMOXICILLIN AND CLAVULANATE POTASSIUM 875; 125 MG/1; MG/1
1 TABLET, FILM COATED ORAL EVERY 12 HOURS
Qty: 14 TABLET | Refills: 0 | Status: SHIPPED | OUTPATIENT
Start: 2024-07-17 | End: 2024-07-24

## 2024-07-17 RX ORDER — NITROFURANTOIN 25; 75 MG/1; MG/1
100 CAPSULE ORAL 2 TIMES DAILY
Qty: 10 CAPSULE | Refills: 0 | Status: SHIPPED | OUTPATIENT
Start: 2024-07-17 | End: 2024-07-22

## 2024-07-17 RX ORDER — POTASSIUM CHLORIDE 750 MG/1
30 TABLET, EXTENDED RELEASE ORAL
Status: COMPLETED | OUTPATIENT
Start: 2024-07-17 | End: 2024-07-17

## 2024-07-17 RX ADMIN — POTASSIUM CHLORIDE 30 MEQ: 750 TABLET, EXTENDED RELEASE ORAL at 06:07

## 2024-07-17 NOTE — ED PROVIDER NOTES
Encounter Date: 2024       History     Chief Complaint   Patient presents with    Abscess     Right top dental abscess that started yesterday.  Also c/o weakness.      Patient is a 48-year-old white female past medical history of cancer, diabetes, hypertension who presented to the ER today due to mouth pain.  She states he has been ongoing for the last few weeks she was not sought care from a dentist.  States he has been taking ibuprofen for it without much relief.  Who states she had fevers but did not have a thermometer to check her temperature.  Who states she was generalized fatigue as well but that is more chronic.  He was states she has been off diabetes medicine for years it was not have a PCP in his not followed up with what kind of cancer she has despite being diagnosed with cancer 1 year ago.  She denies any other complaints including chest pain, shortness of breath or abdominal pain.      Review of patient's allergies indicates:  No Known Allergies  Past Medical History:   Diagnosis Date    Depression     Diabetes mellitus     Hypertension     Mixed hyperlipidemia     Neuropathy      Past Surgical History:   Procedure Laterality Date     SECTION      X2    FOOT SURGERY Left     HYSTERECTOMY      TONSILLECTOMY      WRIST SURGERY Right      No family history on file.  Social History     Tobacco Use    Smoking status: Every Day     Current packs/day: 1.00     Types: Cigarettes    Smokeless tobacco: Never   Substance Use Topics    Alcohol use: Not Currently    Drug use: Never     Review of Systems   Constitutional:  Positive for chills and fatigue. Negative for fever.   HENT:  Positive for dental problem. Negative for congestion, sore throat and trouble swallowing.    Eyes:  Negative for pain and visual disturbance.   Respiratory:  Negative for cough, shortness of breath and wheezing.    Cardiovascular:  Negative for chest pain and palpitations.   Gastrointestinal:  Negative for abdominal pain,  blood in stool, constipation, diarrhea, nausea and vomiting.   Genitourinary:  Negative for dysuria and hematuria.   Musculoskeletal:  Negative for back pain and myalgias.   Skin:  Negative for rash and wound.   Neurological:  Negative for seizures, syncope and headaches.   Psychiatric/Behavioral:  Negative for confusion. The patient is not nervous/anxious.        Physical Exam     Initial Vitals [07/17/24 1657]   BP Pulse Resp Temp SpO2   (!) 150/75 104 18 96.6 °F (35.9 °C) 99 %      MAP       --         Physical Exam    Nursing note and vitals reviewed.  Constitutional: She appears well-developed and well-nourished. She is not diaphoretic. No distress.   HENT:   Head: Normocephalic.   Right Ear: External ear normal.   Left Ear: External ear normal.   Nose: Nose normal.   Poor dentition throughout.  No obvious signs or soft tissue swelling or erythema noted on exam.   Eyes: Conjunctivae and EOM are normal. Right eye exhibits no discharge. Left eye exhibits no discharge. No scleral icterus.   Neck:   Normal range of motion.  Cardiovascular:  Normal rate, regular rhythm and normal heart sounds.     Exam reveals no gallop and no friction rub.       No murmur heard.  Pulmonary/Chest: Breath sounds normal. No stridor. No respiratory distress. She has no wheezes. She has no rhonchi. She has no rales.   Abdominal: Abdomen is soft. She exhibits no distension. There is no abdominal tenderness. There is no rebound and no guarding.   Musculoskeletal:         General: Normal range of motion.      Cervical back: Normal range of motion.     Neurological: She is alert.   Skin: Skin is warm. No rash noted. No erythema.   Psychiatric: She has a normal mood and affect. Her behavior is normal.         ED Course   Procedures  Labs Reviewed   BASIC METABOLIC PANEL - Abnormal; Notable for the following components:       Result Value    Sodium 131 (*)     Potassium 3.3 (*)     Chloride 90 (*)     Glucose 366 (*)     Blood Urea Nitrogen  6.0 (*)     All other components within normal limits   URINALYSIS, REFLEX TO URINE CULTURE - Abnormal; Notable for the following components:    Appearance, UA Cloudy (*)     Glucose, UA 3+ (*)     Ketones, UA 1+ (*)     Blood, UA 1+ (*)     Leukocyte Esterase, UA 1+ (*)     All other components within normal limits   CBC WITH DIFFERENTIAL - Abnormal; Notable for the following components:    RBC 3.92 (*)     Hgb 10.9 (*)     Hct 33.4 (*)     MCHC 32.6 (*)     MPV 10.9 (*)     IG# 0.05 (*)     All other components within normal limits   URINALYSIS, MICROSCOPIC - Abnormal; Notable for the following components:    Bacteria, UA Many (*)     Yeast, UA Few (*)     WBC, UA 50-99 (*)     Squamous Epithelial Cells, UA Few (*)     All other components within normal limits   COVID/FLU A&B PCR - Normal    Narrative:     The Xpert Xpress SARS-CoV-2/FLU/RSV plus is a rapid, multiplexed real-time PCR test intended for the simultaneous qualitative detection and differentiation of SARS-CoV-2, Influenza A, Influenza B, and respiratory syncytial virus (RSV) viral RNA in either nasopharyngeal swab or nasal swab specimens.         STREP GROUP A BY PCR - Normal    Narrative:     The Xpert Xpress Strep A test is a rapid, qualitative in vitro diagnostic test for the detection of Streptococcus pyogenes (Group A ß-hemolytic Streptococcus, Strep A) in throat swab specimens from patients with signs and symptoms of pharyngitis.     CULTURE, URINE   CBC W/ AUTO DIFFERENTIAL    Narrative:     The following orders were created for panel order CBC Auto Differential.  Procedure                               Abnormality         Status                     ---------                               -----------         ------                     CBC with Differential[[id number]]        Abnormal            Final result                 Please view results for these tests on the individual orders.          Imaging Results    None          Medications    potassium chloride SA CR tablet 30 mEq (30 mEq Oral Given 7/17/24 1818)     Medical Decision Making  Differentials: Dental pain, dental fracture, dental decay, dental infection, dental abscess, COVID, flu   Historian is the patient   48-year-old well-appearing female with stable vital signs presents for dental pain chronically.  No evidence of dental infection or abscess at this time but will start patient on Augmentin for the poor dentition throughout.  Advised to make appointment with dentist as soon as possible.  Augmentin sent pharmacy for this reason.  Do the subjective fevers and fatigue labs were done which showed no leukocytosis.  There was however mild hypokalemia in his it was replaced p.o..  Sodium corrected out due to the hyperglycemia at 135..  Elevated glucose but no evidence of DKA or HHS.  I suspect this has been going on chronically.  She was supposed to be on metformin and I did refill this medication.  UTI noted on labs and Macrobid sent pharmacy.  Advised she needs to establish care with a PCP.  Low carb diet was advised.  All questions answered in layman's terms and return precautions were discussed.    Amount and/or Complexity of Data Reviewed  Labs: ordered. Decision-making details documented in ED Course.    Risk  Prescription drug management.                                      Clinical Impression:  Final diagnoses:  [R73.9] Hyperglycemia  [N30.00] Acute cystitis without hematuria  [K08.89] Pain, dental (Primary)          ED Disposition Condition    Discharge Stable          ED Prescriptions       Medication Sig Dispense Start Date End Date Auth. Provider    amoxicillin-clavulanate 875-125mg (AUGMENTIN) 875-125 mg per tablet Take 1 tablet by mouth every 12 (twelve) hours. for 7 days 14 tablet 7/17/2024 7/24/2024 Dionisio Valadez MD    nitrofurantoin, macrocrystal-monohydrate, (MACROBID) 100 MG capsule Take 1 capsule (100 mg total) by mouth 2 (two) times daily. for 5 days 10 capsule  7/17/2024 7/22/2024 Dionisio Valadez MD    metFORMIN (GLUCOPHAGE) 1000 MG tablet Take 1 tablet (1,000 mg total) by mouth 2 (two) times daily with meals. 60 tablet 7/17/2024 8/16/2024 Dionisio Valadez MD          Follow-up Information       Follow up With Specialties Details Why Contact Info    [facility] Emergency Medicine  If symptoms worsen [address] [facility] [id number] [phone]    Jolynn Leon, NP Family Medicine Schedule an appointment as soon as possible for a visit   [address]  [facility] [address] [phone]               Dionisio Valadez MD  07/17/24 9080

## 2024-07-20 LAB — BACTERIA UR CULT: ABNORMAL

## 2024-10-14 ENCOUNTER — HOSPITAL ENCOUNTER (INPATIENT)
Facility: HOSPITAL | Age: 49
LOS: 1 days | Discharge: HOME | DRG: 638 | End: 2024-10-17
Attending: GENERAL PRACTICE | Admitting: INTERNAL MEDICINE
Payer: COMMERCIAL

## 2024-10-14 DIAGNOSIS — D72.829 LEUKOCYTOSIS: Primary | ICD-10-CM

## 2024-10-14 DIAGNOSIS — E86.0 DEHYDRATION: ICD-10-CM

## 2024-10-14 DIAGNOSIS — R10.9 ABDOMINAL PAIN: ICD-10-CM

## 2024-10-14 PROBLEM — N13.30 HYDRONEPHROSIS OF RIGHT KIDNEY: Status: ACTIVE | Noted: 2024-10-14

## 2024-10-14 PROBLEM — E11.65 TYPE 2 DIABETES MELLITUS WITH HYPERGLYCEMIA, WITHOUT LONG-TERM CURRENT USE OF INSULIN: Status: ACTIVE | Noted: 2024-10-14

## 2024-10-14 PROBLEM — E87.6 HYPOKALEMIA: Status: ACTIVE | Noted: 2024-10-14

## 2024-10-14 PROBLEM — E83.39 HYPOPHOSPHATEMIA: Status: ACTIVE | Noted: 2024-10-14

## 2024-10-14 PROBLEM — F17.200 TOBACCO DEPENDENCE: Status: ACTIVE | Noted: 2024-10-14

## 2024-10-14 PROBLEM — N17.9 AKI (ACUTE KIDNEY INJURY): Status: ACTIVE | Noted: 2024-10-14

## 2024-10-14 LAB
ALBUMIN SERPL-MCNC: 2.7 G/DL (ref 3.5–5)
ALBUMIN SERPL-MCNC: 3.4 G/DL (ref 3.5–5)
ALBUMIN/GLOB SERPL: 0.7 RATIO (ref 1.1–2)
ALBUMIN/GLOB SERPL: 0.7 RATIO (ref 1.1–2)
ALLENS TEST: ABNORMAL
ALP SERPL-CCNC: 100 UNIT/L (ref 40–150)
ALP SERPL-CCNC: 138 UNIT/L (ref 40–150)
ALT SERPL-CCNC: 6 UNIT/L (ref 0–55)
ALT SERPL-CCNC: 8 UNIT/L (ref 0–55)
AMPHET UR QL SCN: POSITIVE
ANION GAP SERPL CALC-SCNC: 10 MEQ/L
ANION GAP SERPL CALC-SCNC: 12 MEQ/L
ANION GAP SERPL CALC-SCNC: 14 MEQ/L
ANION GAP SERPL CALC-SCNC: 18 MEQ/L
AST SERPL-CCNC: 6 UNIT/L (ref 5–34)
AST SERPL-CCNC: 8 UNIT/L (ref 5–34)
B-HCG UR QL: NEGATIVE
BACTERIA #/AREA URNS AUTO: ABNORMAL /HPF
BARBITURATE SCN PRESENT UR: NEGATIVE
BASOPHILS # BLD AUTO: 0.06 X10(3)/MCL
BASOPHILS # BLD AUTO: 0.09 X10(3)/MCL
BASOPHILS NFR BLD AUTO: 0.5 %
BASOPHILS NFR BLD AUTO: 0.6 %
BENZODIAZ UR QL SCN: NEGATIVE
BILIRUB SERPL-MCNC: 0.4 MG/DL
BILIRUB SERPL-MCNC: 0.5 MG/DL
BILIRUB UR QL STRIP.AUTO: NEGATIVE
BUN SERPL-MCNC: 11 MG/DL (ref 7–18.7)
BUN SERPL-MCNC: 11 MG/DL (ref 7–18.7)
BUN SERPL-MCNC: 12 MG/DL (ref 7–18.7)
BUN SERPL-MCNC: 12 MG/DL (ref 7–18.7)
CALCIUM SERPL-MCNC: 8.5 MG/DL (ref 8.4–10.2)
CALCIUM SERPL-MCNC: 8.5 MG/DL (ref 8.4–10.2)
CALCIUM SERPL-MCNC: 9.1 MG/DL (ref 8.4–10.2)
CALCIUM SERPL-MCNC: 9.8 MG/DL (ref 8.4–10.2)
CANNABINOIDS UR QL SCN: NEGATIVE
CHLORIDE SERPL-SCNC: 100 MMOL/L (ref 98–107)
CHLORIDE SERPL-SCNC: 102 MMOL/L (ref 98–107)
CHLORIDE SERPL-SCNC: 89 MMOL/L (ref 98–107)
CHLORIDE SERPL-SCNC: 99 MMOL/L (ref 98–107)
CLARITY UR: ABNORMAL
CO2 SERPL-SCNC: 18 MMOL/L (ref 22–29)
CO2 SERPL-SCNC: 20 MMOL/L (ref 22–29)
CO2 SERPL-SCNC: 21 MMOL/L (ref 22–29)
CO2 SERPL-SCNC: 21 MMOL/L (ref 22–29)
COCAINE UR QL SCN: NEGATIVE
COLOR UR AUTO: YELLOW
CREAT SERPL-MCNC: 1.36 MG/DL (ref 0.55–1.02)
CREAT SERPL-MCNC: 1.36 MG/DL (ref 0.55–1.02)
CREAT SERPL-MCNC: 1.43 MG/DL (ref 0.55–1.02)
CREAT SERPL-MCNC: 1.62 MG/DL (ref 0.55–1.02)
CREAT/UREA NIT SERPL: 7
CREAT/UREA NIT SERPL: 8
EOSINOPHIL # BLD AUTO: 0.01 X10(3)/MCL (ref 0–0.9)
EOSINOPHIL # BLD AUTO: 0.14 X10(3)/MCL (ref 0–0.9)
EOSINOPHIL NFR BLD AUTO: 0.1 %
EOSINOPHIL NFR BLD AUTO: 0.8 %
ERYTHROCYTE [DISTWIDTH] IN BLOOD BY AUTOMATED COUNT: 14.9 % (ref 11.5–17)
ERYTHROCYTE [DISTWIDTH] IN BLOOD BY AUTOMATED COUNT: 15 % (ref 11.5–17)
EST. AVERAGE GLUCOSE BLD GHB EST-MCNC: 335 MG/DL
FENTANYL UR QL SCN: NEGATIVE
GFR SERPLBLD CREATININE-BSD FMLA CKD-EPI: 39 ML/MIN/1.73/M2
GFR SERPLBLD CREATININE-BSD FMLA CKD-EPI: 46 ML/MIN/1.73/M2
GFR SERPLBLD CREATININE-BSD FMLA CKD-EPI: 48 ML/MIN/1.73/M2
GFR SERPLBLD CREATININE-BSD FMLA CKD-EPI: 48 ML/MIN/1.73/M2
GLOBULIN SER-MCNC: 3.8 GM/DL (ref 2.4–3.5)
GLOBULIN SER-MCNC: 4.8 GM/DL (ref 2.4–3.5)
GLUCOSE SERPL-MCNC: 298 MG/DL (ref 74–100)
GLUCOSE SERPL-MCNC: 415 MG/DL (ref 74–100)
GLUCOSE SERPL-MCNC: 458 MG/DL (ref 74–100)
GLUCOSE SERPL-MCNC: 649 MG/DL (ref 74–100)
GLUCOSE SERPL-MCNC: >500 MG/DL (ref 70–110)
GLUCOSE UR QL STRIP: ABNORMAL
HBA1C MFR BLD: 13.3 %
HCO3 UR-SCNC: 25.4 MMOL/L (ref 24–28)
HCT VFR BLD AUTO: 29.3 % (ref 37–47)
HCT VFR BLD AUTO: 35.6 % (ref 37–47)
HGB BLD-MCNC: 10 G/DL (ref 12–16)
HGB BLD-MCNC: 12 G/DL (ref 12–16)
HGB UR QL STRIP: ABNORMAL
IMM GRANULOCYTES # BLD AUTO: 0.04 X10(3)/MCL (ref 0–0.04)
IMM GRANULOCYTES # BLD AUTO: 0.09 X10(3)/MCL (ref 0–0.04)
IMM GRANULOCYTES NFR BLD AUTO: 0.4 %
IMM GRANULOCYTES NFR BLD AUTO: 0.5 %
KETONES UR QL STRIP: NEGATIVE
LACTATE SERPL-SCNC: 2.8 MMOL/L (ref 0.5–2.2)
LACTATE SERPL-SCNC: 4.1 MMOL/L (ref 0.5–2.2)
LACTATE SERPL-SCNC: 4.2 MMOL/L (ref 0.5–2.2)
LEUKOCYTE ESTERASE UR QL STRIP: NEGATIVE
LYMPHOCYTES # BLD AUTO: 0.77 X10(3)/MCL (ref 0.6–4.6)
LYMPHOCYTES # BLD AUTO: 0.93 X10(3)/MCL (ref 0.6–4.6)
LYMPHOCYTES NFR BLD AUTO: 5.6 %
LYMPHOCYTES NFR BLD AUTO: 7.1 %
MCH RBC QN AUTO: 28.2 PG (ref 27–31)
MCH RBC QN AUTO: 28.4 PG (ref 27–31)
MCHC RBC AUTO-ENTMCNC: 33.7 G/DL (ref 33–36)
MCHC RBC AUTO-ENTMCNC: 34.1 G/DL (ref 33–36)
MCV RBC AUTO: 82.5 FL (ref 80–94)
MCV RBC AUTO: 84.4 FL (ref 80–94)
MDMA UR QL SCN: NEGATIVE
MONOCYTES # BLD AUTO: 0.64 X10(3)/MCL (ref 0.1–1.3)
MONOCYTES # BLD AUTO: 0.73 X10(3)/MCL (ref 0.1–1.3)
MONOCYTES NFR BLD AUTO: 4.4 %
MONOCYTES NFR BLD AUTO: 5.9 %
NEUTROPHILS # BLD AUTO: 14.52 X10(3)/MCL (ref 2.1–9.2)
NEUTROPHILS # BLD AUTO: 9.31 X10(3)/MCL (ref 2.1–9.2)
NEUTROPHILS NFR BLD AUTO: 85.9 %
NEUTROPHILS NFR BLD AUTO: 88.2 %
NITRITE UR QL STRIP: NEGATIVE
NRBC BLD AUTO-RTO: 0 %
NRBC BLD AUTO-RTO: 0 %
OHS QRS DURATION: 84 MS
OHS QTC CALCULATION: 457 MS
OPIATES UR QL SCN: NEGATIVE
PCO2 BLDA: 43.3 MMHG (ref 35–45)
PCP UR QL: NEGATIVE
PH SMN: 7.38 [PH] (ref 7.35–7.45)
PH UR STRIP: 6 [PH]
PH UR: 6 [PH] (ref 3–11)
PHOSPHATE SERPL-MCNC: 1 MG/DL (ref 2.3–4.7)
PLATELET # BLD AUTO: 238 X10(3)/MCL (ref 130–400)
PLATELET # BLD AUTO: 318 X10(3)/MCL (ref 130–400)
PMV BLD AUTO: 10.7 FL (ref 7.4–10.4)
PMV BLD AUTO: 11.1 FL (ref 7.4–10.4)
PO2 BLDA: 17 MMHG (ref 40–60)
POC BE: 0 MMOL/L
POC SATURATED O2: 23 % (ref 95–100)
POC TCO2: 27 MMOL/L (ref 24–29)
POCT GLUCOSE: 253 MG/DL (ref 70–110)
POCT GLUCOSE: 404 MG/DL (ref 70–110)
POTASSIUM SERPL-SCNC: 2.9 MMOL/L (ref 3.5–5.1)
POTASSIUM SERPL-SCNC: 3.1 MMOL/L (ref 3.5–5.1)
POTASSIUM SERPL-SCNC: 3.2 MMOL/L (ref 3.5–5.1)
POTASSIUM SERPL-SCNC: 4.1 MMOL/L (ref 3.5–5.1)
PROT SERPL-MCNC: 6.5 GM/DL (ref 6.4–8.3)
PROT SERPL-MCNC: 8.2 GM/DL (ref 6.4–8.3)
PROT UR QL STRIP: NEGATIVE
RBC # BLD AUTO: 3.55 X10(6)/MCL (ref 4.2–5.4)
RBC # BLD AUTO: 4.22 X10(6)/MCL (ref 4.2–5.4)
RBC #/AREA URNS AUTO: ABNORMAL /HPF
SAMPLE: ABNORMAL
SITE: ABNORMAL
SODIUM SERPL-SCNC: 128 MMOL/L (ref 136–145)
SODIUM SERPL-SCNC: 131 MMOL/L (ref 136–145)
SODIUM SERPL-SCNC: 131 MMOL/L (ref 136–145)
SODIUM SERPL-SCNC: 134 MMOL/L (ref 136–145)
SP GR UR STRIP.AUTO: <=1.005 (ref 1–1.03)
SPECIFIC GRAVITY, URINE AUTO (.000) (OHS): <1.005 (ref 1–1.03)
SQUAMOUS #/AREA URNS AUTO: ABNORMAL /HPF
TROPONIN I SERPL-MCNC: <0.01 NG/ML (ref 0–0.04)
UROBILINOGEN UR STRIP-ACNC: 0.2
WBC # BLD AUTO: 10.83 X10(3)/MCL (ref 4.5–11.5)
WBC # BLD AUTO: 16.5 X10(3)/MCL (ref 4.5–11.5)
WBC #/AREA URNS AUTO: ABNORMAL /HPF
WBC CLUMPS UR QL AUTO: ABNORMAL

## 2024-10-14 PROCEDURE — 99900035 HC TECH TIME PER 15 MIN (STAT)

## 2024-10-14 PROCEDURE — 96375 TX/PRO/DX INJ NEW DRUG ADDON: CPT

## 2024-10-14 PROCEDURE — 80307 DRUG TEST PRSMV CHEM ANLYZR: CPT | Performed by: INTERNAL MEDICINE

## 2024-10-14 PROCEDURE — G0378 HOSPITAL OBSERVATION PER HR: HCPCS

## 2024-10-14 PROCEDURE — 96376 TX/PRO/DX INJ SAME DRUG ADON: CPT

## 2024-10-14 PROCEDURE — 96367 TX/PROPH/DG ADDL SEQ IV INF: CPT

## 2024-10-14 PROCEDURE — 96365 THER/PROPH/DIAG IV INF INIT: CPT

## 2024-10-14 PROCEDURE — 94799 UNLISTED PULMONARY SVC/PX: CPT

## 2024-10-14 PROCEDURE — 84484 ASSAY OF TROPONIN QUANT: CPT | Performed by: GENERAL PRACTICE

## 2024-10-14 PROCEDURE — 63600175 PHARM REV CODE 636 W HCPCS: Performed by: GENERAL PRACTICE

## 2024-10-14 PROCEDURE — 87077 CULTURE AEROBIC IDENTIFY: CPT | Performed by: GENERAL PRACTICE

## 2024-10-14 PROCEDURE — 25000003 PHARM REV CODE 250: Performed by: GENERAL PRACTICE

## 2024-10-14 PROCEDURE — 87086 URINE CULTURE/COLONY COUNT: CPT | Performed by: GENERAL PRACTICE

## 2024-10-14 PROCEDURE — 63600175 PHARM REV CODE 636 W HCPCS: Performed by: FAMILY MEDICINE

## 2024-10-14 PROCEDURE — 81025 URINE PREGNANCY TEST: CPT | Performed by: GENERAL PRACTICE

## 2024-10-14 PROCEDURE — 25000003 PHARM REV CODE 250: Performed by: FAMILY MEDICINE

## 2024-10-14 PROCEDURE — 81001 URINALYSIS AUTO W/SCOPE: CPT | Mod: XB | Performed by: GENERAL PRACTICE

## 2024-10-14 PROCEDURE — 25000003 PHARM REV CODE 250: Performed by: INTERNAL MEDICINE

## 2024-10-14 PROCEDURE — 87186 SC STD MICRODIL/AGAR DIL: CPT | Performed by: GENERAL PRACTICE

## 2024-10-14 PROCEDURE — 96372 THER/PROPH/DIAG INJ SC/IM: CPT | Performed by: INTERNAL MEDICINE

## 2024-10-14 PROCEDURE — 93005 ELECTROCARDIOGRAM TRACING: CPT

## 2024-10-14 PROCEDURE — 80053 COMPREHEN METABOLIC PANEL: CPT | Mod: 91 | Performed by: GENERAL PRACTICE

## 2024-10-14 PROCEDURE — 94761 N-INVAS EAR/PLS OXIMETRY MLT: CPT

## 2024-10-14 PROCEDURE — 83036 HEMOGLOBIN GLYCOSYLATED A1C: CPT | Performed by: INTERNAL MEDICINE

## 2024-10-14 PROCEDURE — 82962 GLUCOSE BLOOD TEST: CPT

## 2024-10-14 PROCEDURE — 84100 ASSAY OF PHOSPHORUS: CPT | Performed by: GENERAL PRACTICE

## 2024-10-14 PROCEDURE — 63600175 PHARM REV CODE 636 W HCPCS: Performed by: INTERNAL MEDICINE

## 2024-10-14 PROCEDURE — 99285 EMERGENCY DEPT VISIT HI MDM: CPT | Mod: 25

## 2024-10-14 PROCEDURE — 85025 COMPLETE CBC W/AUTO DIFF WBC: CPT | Performed by: GENERAL PRACTICE

## 2024-10-14 PROCEDURE — 96366 THER/PROPH/DIAG IV INF ADDON: CPT

## 2024-10-14 PROCEDURE — 36415 COLL VENOUS BLD VENIPUNCTURE: CPT | Mod: 91 | Performed by: INTERNAL MEDICINE

## 2024-10-14 PROCEDURE — 96361 HYDRATE IV INFUSION ADD-ON: CPT

## 2024-10-14 PROCEDURE — 83605 ASSAY OF LACTIC ACID: CPT | Mod: 91 | Performed by: GENERAL PRACTICE

## 2024-10-14 RX ORDER — SODIUM CHLORIDE AND POTASSIUM CHLORIDE 150; 900 MG/100ML; MG/100ML
INJECTION, SOLUTION INTRAVENOUS CONTINUOUS
Status: DISCONTINUED | OUTPATIENT
Start: 2024-10-14 | End: 2024-10-15

## 2024-10-14 RX ORDER — ONDANSETRON HYDROCHLORIDE 2 MG/ML
4 INJECTION, SOLUTION INTRAVENOUS
Status: COMPLETED | OUTPATIENT
Start: 2024-10-14 | End: 2024-10-14

## 2024-10-14 RX ORDER — ACETAMINOPHEN 500 MG
1000 TABLET ORAL EVERY 6 HOURS PRN
Status: DISCONTINUED | OUTPATIENT
Start: 2024-10-14 | End: 2024-10-17 | Stop reason: HOSPADM

## 2024-10-14 RX ORDER — GLUCAGON 1 MG
1 KIT INJECTION
Status: DISCONTINUED | OUTPATIENT
Start: 2024-10-14 | End: 2024-10-17 | Stop reason: HOSPADM

## 2024-10-14 RX ORDER — IBUPROFEN 200 MG
16 TABLET ORAL
Status: DISCONTINUED | OUTPATIENT
Start: 2024-10-14 | End: 2024-10-14

## 2024-10-14 RX ORDER — POTASSIUM CHLORIDE 750 MG/1
10 TABLET, EXTENDED RELEASE ORAL 2 TIMES DAILY
Status: DISCONTINUED | OUTPATIENT
Start: 2024-10-14 | End: 2024-10-17 | Stop reason: HOSPADM

## 2024-10-14 RX ORDER — TRAMADOL HYDROCHLORIDE 50 MG/1
50 TABLET ORAL EVERY 6 HOURS PRN
Status: DISCONTINUED | OUTPATIENT
Start: 2024-10-14 | End: 2024-10-17 | Stop reason: HOSPADM

## 2024-10-14 RX ORDER — MORPHINE SULFATE 2 MG/ML
2 INJECTION, SOLUTION INTRAMUSCULAR; INTRAVENOUS EVERY 4 HOURS PRN
Status: DISCONTINUED | OUTPATIENT
Start: 2024-10-14 | End: 2024-10-14

## 2024-10-14 RX ORDER — MORPHINE SULFATE 2 MG/ML
2 INJECTION, SOLUTION INTRAMUSCULAR; INTRAVENOUS
Status: COMPLETED | OUTPATIENT
Start: 2024-10-14 | End: 2024-10-14

## 2024-10-14 RX ORDER — TALC
6 POWDER (GRAM) TOPICAL NIGHTLY PRN
Status: DISCONTINUED | OUTPATIENT
Start: 2024-10-14 | End: 2024-10-17 | Stop reason: HOSPADM

## 2024-10-14 RX ORDER — LISINOPRIL 5 MG/1
10 TABLET ORAL DAILY
Status: DISCONTINUED | OUTPATIENT
Start: 2024-10-14 | End: 2024-10-17 | Stop reason: HOSPADM

## 2024-10-14 RX ORDER — POTASSIUM CHLORIDE 750 MG/1
40 TABLET, EXTENDED RELEASE ORAL
Status: COMPLETED | OUTPATIENT
Start: 2024-10-14 | End: 2024-10-14

## 2024-10-14 RX ORDER — KETOROLAC TROMETHAMINE 30 MG/ML
15 INJECTION, SOLUTION INTRAMUSCULAR; INTRAVENOUS EVERY 6 HOURS PRN
Status: DISCONTINUED | OUTPATIENT
Start: 2024-10-14 | End: 2024-10-14

## 2024-10-14 RX ORDER — POTASSIUM CHLORIDE 14.9 MG/ML
20 INJECTION INTRAVENOUS
Status: DISCONTINUED | OUTPATIENT
Start: 2024-10-14 | End: 2024-10-14

## 2024-10-14 RX ORDER — INSULIN ASPART 100 [IU]/ML
0-15 INJECTION, SOLUTION INTRAVENOUS; SUBCUTANEOUS
Status: DISCONTINUED | OUTPATIENT
Start: 2024-10-14 | End: 2024-10-17 | Stop reason: HOSPADM

## 2024-10-14 RX ORDER — POTASSIUM CHLORIDE 7.45 MG/ML
10 INJECTION INTRAVENOUS
Status: COMPLETED | OUTPATIENT
Start: 2024-10-14 | End: 2024-10-14

## 2024-10-14 RX ORDER — TALC
6 POWDER (GRAM) TOPICAL NIGHTLY PRN
Status: DISCONTINUED | OUTPATIENT
Start: 2024-10-14 | End: 2024-10-14

## 2024-10-14 RX ORDER — HYDROCODONE BITARTRATE AND ACETAMINOPHEN 5; 325 MG/1; MG/1
1 TABLET ORAL EVERY 6 HOURS PRN
Status: DISCONTINUED | OUTPATIENT
Start: 2024-10-14 | End: 2024-10-17 | Stop reason: HOSPADM

## 2024-10-14 RX ORDER — IBUPROFEN 200 MG
24 TABLET ORAL
Status: DISCONTINUED | OUTPATIENT
Start: 2024-10-14 | End: 2024-10-17 | Stop reason: HOSPADM

## 2024-10-14 RX ORDER — KETOROLAC TROMETHAMINE 30 MG/ML
30 INJECTION, SOLUTION INTRAMUSCULAR; INTRAVENOUS
Status: COMPLETED | OUTPATIENT
Start: 2024-10-14 | End: 2024-10-14

## 2024-10-14 RX ORDER — ONDANSETRON HYDROCHLORIDE 2 MG/ML
4 INJECTION, SOLUTION INTRAVENOUS EVERY 8 HOURS PRN
Status: DISCONTINUED | OUTPATIENT
Start: 2024-10-14 | End: 2024-10-14

## 2024-10-14 RX ORDER — SODIUM CHLORIDE 0.9 % (FLUSH) 0.9 %
10 SYRINGE (ML) INJECTION
Status: DISCONTINUED | OUTPATIENT
Start: 2024-10-14 | End: 2024-10-14

## 2024-10-14 RX ORDER — POTASSIUM CHLORIDE 750 MG/1
40 TABLET, EXTENDED RELEASE ORAL ONCE
Status: COMPLETED | OUTPATIENT
Start: 2024-10-14 | End: 2024-10-14

## 2024-10-14 RX ORDER — IBUPROFEN 200 MG
16 TABLET ORAL
Status: DISCONTINUED | OUTPATIENT
Start: 2024-10-14 | End: 2024-10-17 | Stop reason: HOSPADM

## 2024-10-14 RX ORDER — GLUCAGON 1 MG
1 KIT INJECTION
Status: DISCONTINUED | OUTPATIENT
Start: 2024-10-14 | End: 2024-10-14

## 2024-10-14 RX ORDER — SODIUM CHLORIDE, SODIUM LACTATE, POTASSIUM CHLORIDE, CALCIUM CHLORIDE 600; 310; 30; 20 MG/100ML; MG/100ML; MG/100ML; MG/100ML
INJECTION, SOLUTION INTRAVENOUS CONTINUOUS
Status: DISCONTINUED | OUTPATIENT
Start: 2024-10-14 | End: 2024-10-14

## 2024-10-14 RX ORDER — GABAPENTIN 300 MG/1
300 CAPSULE ORAL 3 TIMES DAILY
Status: DISCONTINUED | OUTPATIENT
Start: 2024-10-14 | End: 2024-10-17 | Stop reason: HOSPADM

## 2024-10-14 RX ORDER — IBUPROFEN 200 MG
24 TABLET ORAL
Status: DISCONTINUED | OUTPATIENT
Start: 2024-10-14 | End: 2024-10-14

## 2024-10-14 RX ORDER — INSULIN GLARGINE 100 [IU]/ML
14 INJECTION, SOLUTION SUBCUTANEOUS NIGHTLY
Status: DISCONTINUED | OUTPATIENT
Start: 2024-10-14 | End: 2024-10-17 | Stop reason: HOSPADM

## 2024-10-14 RX ORDER — IBUPROFEN 400 MG/1
400 TABLET ORAL EVERY 6 HOURS PRN
Status: DISCONTINUED | OUTPATIENT
Start: 2024-10-14 | End: 2024-10-14

## 2024-10-14 RX ORDER — ONDANSETRON HYDROCHLORIDE 2 MG/ML
4 INJECTION, SOLUTION INTRAVENOUS EVERY 6 HOURS PRN
Status: DISCONTINUED | OUTPATIENT
Start: 2024-10-14 | End: 2024-10-17 | Stop reason: HOSPADM

## 2024-10-14 RX ADMIN — POTASSIUM CHLORIDE AND SODIUM CHLORIDE: 900; 150 INJECTION, SOLUTION INTRAVENOUS at 06:10

## 2024-10-14 RX ADMIN — LISINOPRIL 10 MG: 5 TABLET ORAL at 09:10

## 2024-10-14 RX ADMIN — ONDANSETRON 4 MG: 2 INJECTION INTRAMUSCULAR; INTRAVENOUS at 03:10

## 2024-10-14 RX ADMIN — POTASSIUM CHLORIDE AND SODIUM CHLORIDE: 900; 150 INJECTION, SOLUTION INTRAVENOUS at 09:10

## 2024-10-14 RX ADMIN — MORPHINE SULFATE 2 MG: 2 INJECTION, SOLUTION INTRAMUSCULAR; INTRAVENOUS at 03:10

## 2024-10-14 RX ADMIN — POTASSIUM CHLORIDE 40 MEQ: 750 TABLET, EXTENDED RELEASE ORAL at 12:10

## 2024-10-14 RX ADMIN — INSULIN HUMAN 8 UNITS: 100 INJECTION, SOLUTION PARENTERAL at 03:10

## 2024-10-14 RX ADMIN — ONDANSETRON 4 MG: 2 INJECTION INTRAMUSCULAR; INTRAVENOUS at 10:10

## 2024-10-14 RX ADMIN — GABAPENTIN 300 MG: 300 CAPSULE ORAL at 09:10

## 2024-10-14 RX ADMIN — Medication 6 MG: at 09:10

## 2024-10-14 RX ADMIN — GABAPENTIN 300 MG: 300 CAPSULE ORAL at 02:10

## 2024-10-14 RX ADMIN — POTASSIUM CHLORIDE 40 MEQ: 750 TABLET, EXTENDED RELEASE ORAL at 04:10

## 2024-10-14 RX ADMIN — INSULIN HUMAN 4 UNITS: 100 INJECTION, SOLUTION PARENTERAL at 06:10

## 2024-10-14 RX ADMIN — KETOROLAC TROMETHAMINE 30 MG: 30 INJECTION, SOLUTION INTRAMUSCULAR at 05:10

## 2024-10-14 RX ADMIN — POTASSIUM CHLORIDE 10 MEQ: 750 TABLET, EXTENDED RELEASE ORAL at 09:10

## 2024-10-14 RX ADMIN — SODIUM PHOSPHATE, MONOBASIC, MONOHYDRATE AND SODIUM PHOSPHATE, DIBASIC, ANHYDROUS 30 MMOL: 276; 142 INJECTION, SOLUTION INTRAVENOUS at 03:10

## 2024-10-14 RX ADMIN — INSULIN GLARGINE 14 UNITS: 100 INJECTION, SOLUTION SUBCUTANEOUS at 09:10

## 2024-10-14 RX ADMIN — SODIUM CHLORIDE 1000 ML: 9 INJECTION, SOLUTION INTRAVENOUS at 04:10

## 2024-10-14 RX ADMIN — INSULIN ASPART 6 UNITS: 100 INJECTION, SOLUTION INTRAVENOUS; SUBCUTANEOUS at 09:10

## 2024-10-14 RX ADMIN — INSULIN ASPART 15 UNITS: 100 INJECTION, SOLUTION INTRAVENOUS; SUBCUTANEOUS at 05:10

## 2024-10-14 RX ADMIN — HYDROCODONE BITARTRATE AND ACETAMINOPHEN 1 TABLET: 5; 325 TABLET ORAL at 09:10

## 2024-10-14 RX ADMIN — KETOROLAC TROMETHAMINE 15 MG: 30 INJECTION, SOLUTION INTRAMUSCULAR at 03:10

## 2024-10-14 RX ADMIN — SODIUM CHLORIDE 1000 ML: 9 INJECTION, SOLUTION INTRAVENOUS at 03:10

## 2024-10-14 RX ADMIN — POTASSIUM CHLORIDE 10 MEQ: 7.46 INJECTION, SOLUTION INTRAVENOUS at 04:10

## 2024-10-14 NOTE — ED NOTES
Pt wheeled to ed rm 3 from [facility]. Aaox4. Reports rlq pain that started 4hrs ago with nausea. Denies vomiting, diarrhea, fever. Last bm yesterday. Pt has rebound tenderness upon palpation. Cbg 500. Pt states she has not been taking her medications. Hx dm. Placed on monitors. Wctm

## 2024-10-14 NOTE — H&P
"  [facility] Medicine  History & Physical    Patient Name: Cici Ward  MRN: [id number]  Patient Class: OP- Observation  Admission Date: 10/14/2024  Attending Physician: Neha Isbell DO   Primary Care Provider: Jolynn Leon, TOI         Patient information was obtained from patient and ER records.     Subjective:     Principal Problem:Type 2 diabetes mellitus with hyperglycemia, without long-term current use of insulin    Chief Complaint:   Chief Complaint   Patient presents with    Abdominal Pain     RLQ abd pain that started 4hrs ago with nausea. Denies vomiting, fever, diarrhea. Last bm yesterday.         HPI: 48 yo CF with PMH of DMT2 off of medications for 1 year due to "not making it a priority", severe peripheral neuropathy, tobacco use-<1ppd since a teenager (likes to smoke and does not want to quit), Pt presented to the ER with RUQ pain. Vitals with htn and tachycardia. Glucose 649, K 2.9, Cr 1.62, CO2 21, Na 128, WBC elevated 16.5, lactic acid 4.2. No ketones in urine. No BOHB ordered due to send out. CXR, ua, EKG, trop all okay. VBG with 7.37 pH. CT abd with prominent but not inflammed appendix, right hydronephrosis without stone indicating recently passed stone. Pt was given insulin, ivf, zosyn, Kcl, troadol.  Labs improving. Hospitalist consulted for admission.     Upon exam, she was lying in bed making gasping/crying sounds with her mouth in between speaking words. She states that her feet are hurting. She also states that her abdominal pain that she had when she arrived at the ER had resolved. She denies constipation. States last bm was 1.5 days ago and that sometimes she goes a week without a bm, but this is normal for her. No nausea. No CP or SOB.     Past Medical History:   Diagnosis Date    Depression     Diabetes mellitus     Hypertension     Mixed hyperlipidemia     Neuropathy        Past Surgical History:   Procedure Laterality Date    "  SECTION      X2    FOOT SURGERY Left     HYSTERECTOMY      TONSILLECTOMY      WRIST SURGERY Right        Review of patient's allergies indicates:  No Known Allergies    No current facility-administered medications on file prior to encounter.     Current Outpatient Medications on File Prior to Encounter   Medication Sig    gabapentin (NEURONTIN) 300 MG capsule Take 1 capsule (300 mg total) by mouth 3 (three) times daily. (Patient taking differently: Take 300 mg by mouth 3 (three) times daily. Patient reported she has been out of this medication.)    lisinopriL 10 MG tablet Take 1 tablet (10 mg total) by mouth once daily.    ibuprofen (ADVIL,MOTRIN) 800 MG tablet 800 mg. As needed    metFORMIN (GLUCOPHAGE) 1000 MG tablet Take 1 tablet (1,000 mg total) by mouth 2 (two) times daily with meals.    oxyCODONE-acetaminophen (PERCOCET) 5-325 mg per tablet Take 1 tablet by mouth every 4 (four) hours as needed for Pain (take 1 for pain on 1-5; 2 for pain 6-10).    VIMPAT 100 mg Tab Take 100 mg by mouth.     Family History    None       Tobacco Use    Smoking status: Every Day     Current packs/day: 1.00     Types: Cigarettes    Smokeless tobacco: Never   Substance and Sexual Activity    Alcohol use: Not Currently    Drug use: Never    Sexual activity: Not on file     Review of Systems   Constitutional:  Positive for activity change. Negative for appetite change and fever.   Respiratory:  Negative for chest tightness and shortness of breath.    Cardiovascular:  Negative for chest pain and leg swelling.   Gastrointestinal:  Positive for abdominal pain (rlq). Negative for abdominal distention, constipation, diarrhea, nausea and vomiting.   Musculoskeletal:  Negative for arthralgias.        Severe leg/foot pain due to neuropathy   Skin:  Negative for rash and wound.     Objective:     Vital Signs (Most Recent):  Temp: 99.1 °F (37.3 °C) (10/14/24 1617)  Pulse: 97 (10/14/24 1617)  Resp: 18 (10/14/24  1617)  BP: 111/63 (10/14/24 1617)  SpO2: 100 % (10/14/24 1617) Vital Signs (24h Range):  Temp:  [97.8 °F (36.6 °C)-99.1 °F (37.3 °C)] 99.1 °F (37.3 °C)  Pulse:  [] 97  Resp:  [18-23] 18  SpO2:  [97 %-100 %] 100 %  BP: (111-169)/() 111/63     Weight: 68.4 kg (150 lb 12.7 oz)  Body mass index is 24.34 kg/m².     Physical Exam  Vitals and nursing note reviewed.   Constitutional:       Comments: Patient is making short crying/gasping sounds in between words while sitting up in the bed. She appears to be in no acute distress. Significant other at bedside.   HENT:      Head: Normocephalic and atraumatic.      Nose: Nose normal.   Eyes:      Conjunctiva/sclera: Conjunctivae normal.   Cardiovascular:      Rate and Rhythm: Normal rate and regular rhythm.      Pulses: Normal pulses.      Heart sounds: Normal heart sounds. No murmur heard.     No gallop.   Pulmonary:      Effort: Pulmonary effort is normal.      Breath sounds: Normal breath sounds. No wheezing, rhonchi or rales.   Abdominal:      General: Bowel sounds are normal. There is no distension.      Palpations: Abdomen is soft.      Tenderness: There is abdominal tenderness (Patient jumped and expressed discomfort when I palpated her RLQ.  Response appeared to be inconsistent with the rest of the clinical exam.). There is no guarding.   Musculoskeletal:         General: No swelling or deformity. Normal range of motion.      Cervical back: Normal range of motion and neck supple.      Right lower leg: No edema.      Left lower leg: No edema.      Comments: Patient screamed and started crying hysterically when I palpated her ankle for edema. Response was not consistent with the rest of the clinical exam.   Skin:     General: Skin is warm and dry.   Neurological:      General: No focal deficit present.      Mental Status: She is alert and oriented to person, place, and time. Mental status is at baseline.      Gait: Gait normal.                Significant Labs:  All pertinent labs within the past 24 hours have been reviewed.  Recent Lab Results  (Last 5 results in the past 24 hours)        10/14/24  1414   10/14/24  0755   10/14/24  0746   10/14/24  0727   10/14/24  0551        Albumin/Globulin Ratio       0.7         Albumin       2.7         ALP       100         ALT       6         Anion Gap 12.0       10.0   14.0       AST       6         Baso #       0.06         Basophil %       0.6         BILIRUBIN TOTAL       0.4         BUN 12.0       11.0   11.0       BUN/CREAT RATIO 8       8   8       Calcium 9.1       8.5   8.5       Chloride 102       100   99       CO2 20       21   18       Creatinine 1.43       1.36   1.36       eGFR 46       48   48       Eos #       0.01         Eos %       0.1         Globulin, Total       3.8         Glucose 298       415   458  Comment: Critical Result called and verified by verbal readback to: Mesfin  on 10/14/2024 at 06:13. Reported by [id number].       Hematocrit       29.3         Hemoglobin       10.0         Immature Grans (Abs)       0.04         Immature Granulocytes       0.4         Lactic Acid Level   2.8       4.1  Comment: Critical Result called and verified by verbal readback to: Mesfin on 10/14/2024 at 06:13. Reported by [id number].       Lymph #       0.77         LYMPH %       7.1         MCH       28.2         MCHC       34.1         MCV       82.5         Mono #       0.64         Mono %       5.9         MPV       10.7         Neut #       9.31         Neut %       85.9         nRBC       0.0         QRS Duration     84           [facility] QTC Calculation     457           Phosphorus Level       1.0  Comment: Critical Result called and verified by verbal readback to: rosaura encarnacion on 10/14/2024 at 13:23. Reported by [id number].         Platelet Count       238         Potassium 4.1       3.1   3.2       PROTEIN TOTAL       6.5         RBC       3.55         RDW       14.9         Sodium 134       131   131       Troponin I        <0.010         WBC       10.83                                Significant Imaging: I have reviewed all pertinent imaging results/findings within the past 24 hours.  Assessment/Plan:     * Type 2 diabetes mellitus with hyperglycemia, without long-term current use of insulin  Admit for IVF, electrolyte replacement.  On tele for electrolyte disturbances. Can dc once improved.  Labs q6-cmp and phos   Starting glargine 14 units qpm  ISS high  On zosyn. De-escalate as needed. Urine culture and blood culture pending. Pt with RLQ abd pain upon palpation that does not match CT findings/rest of clinical picture.  Monitoring I/Os    VTE Ppx: SCDs    Hydronephrosis of right kidney  UA okay.  On abd ct, indicating recently passed renal stone.   Will monitor I/Os.   RLQ Abd pain improved at rest. Severe pain upon palpation, out of proportion to CT findings.   Pt was started on zosyn in ER. Blood cultures were collected. Can de-escalate if feel no infectious process.       Tobacco dependence  Dangers of cigarette smoking were reviewed with patient in detail. Patient was Counseled for 3-10 minutes. Nicotine replacement options were discussed. Nicotine replacement was discussed- not prescribed per patient's request  Pt states that she does not want to stop smoking. She likes to smoke. She told me this more than once.     Hypophosphatemia  Patient has Abnormal Phosphorus: hypophosphatemia. Will continue to monitor electrolytes closely. Will replace the affected electrolytes and repeat labs to be done after interventions completed. The patient's phosphorus results have been reviewed and are listed below.  Recent Labs   Lab 10/14/24  0727   PHOS 1.0*        Hypokalemia  Patient's most recent potassium results are listed below.   Recent Labs     10/14/24  0551 10/14/24  0727 10/14/24  1414   K 3.2* 3.1* 4.1     Plan  - Replete potassium per protocol  - Monitor potassium Every 6 hours  - Patient's hypokalemia is improving        VTE Risk  Mitigation (From admission, onward)           Ordered     IP VTE LOW RISK PATIENT  Once         10/14/24 0929     Place sequential compression device  Until discontinued         10/14/24 0929                       On 10/14/2024, patient should be placed in hospital observation services under my care.             CHIRS BENNETT DO  Department of Hospital Medicine  [facility]

## 2024-10-14 NOTE — ASSESSMENT & PLAN NOTE
Dangers of cigarette smoking were reviewed with patient in detail. Patient was Counseled for 3-10 minutes. Nicotine replacement options were discussed. Nicotine replacement was discussed- not prescribed per patient's request  Pt states that she does not want to stop smoking. She likes to smoke. She told me this more than once.

## 2024-10-14 NOTE — ASSESSMENT & PLAN NOTE
Admit for IVF, electrolyte replacement.  On tele for electrolyte disturbances. Can dc once improved.  Labs q6-cmp and phos   Starting glargine 14 units qpm  ISS high  On zosyn. De-escalate as needed. Urine culture and blood culture pending. Pt with RLQ abd pain upon palpation that does not match CT findings/rest of clinical picture.  Monitoring I/Os    VTE Ppx: SCDs

## 2024-10-14 NOTE — PLAN OF CARE
10/14/24 1454   Discharge Assessment   Assessment Type Discharge Planning Assessment   Confirmed/corrected address, phone number and insurance Yes   Source of Information patient   Does patient/caregiver understand observation status Yes   Communicated SHON with patient/caregiver Date not available/Unable to determine   Reason For Admission Abdominal pain   People in Home spouse   Facility Arrived From: Home   Do you expect to return to your current living situation? Yes   Do you have help at home or someone to help you manage your care at home? Yes   Who are your caregiver(s) and their phone number(s)? Reyes Ward (Spouse)  [phone]   Prior to hospitilization cognitive status: Alert/Oriented   Current cognitive status: Alert/Oriented   Walking or Climbing Stairs Difficulty no   Dressing/Bathing Difficulty no   Equipment Currently Used at Home none   Readmission within 30 days? No   Do you currently have service(s) that help you manage your care at home? No   Do you take prescription medications? Yes   Do you have prescription coverage? Yes   Coverage [employer]BS   Do you have any problems affording any of your prescribed medications? No   Is the patient taking medications as prescribed? no   Who is going to help you get home at discharge? Reyes Ward (Spouse)  [phone]   How do you get to doctors appointments? car, drives self;family or friend will provide   Are you on dialysis? No   Do you take coumadin? No   Discharge Plan A Home   DME Needed Upon Discharge  none   Discharge Plan discussed with: Patient   Transition of Care Barriers None   Physical Activity   On average, how many days per week do you engage in moderate to strenuous exercise (like a brisk walk)? 0 days   On average, how many minutes do you engage in exercise at this level? 0 min   Financial Resource Strain   How hard is it for you to pay for the very basics like food, housing, medical care, and heating? Not very   Housing Stability   In the  last 12 months, was there a time when you were not able to pay the mortgage or rent on time? N   At any time in the past 12 months, were you homeless or living in a shelter (including now)? N   Transportation Needs   Has the lack of transportation kept you from medical appointments, meetings, work or from getting things needed for daily living? No   Food Insecurity   Within the past 12 months, you worried that your food would run out before you got the money to buy more. Never true   Within the past 12 months, the food you bought just didn't last and you didn't have money to get more. Never true   Stress   Do you feel stress - tense, restless, nervous, or anxious, or unable to sleep at night because your mind is troubled all the time - these days? Not at all   Social Isolation   How often do you feel lonely or isolated from those around you?  Never   Alcohol Use   Q1: How often do you have a drink containing alcohol? Pt Declined   Q2: How many drinks containing alcohol do you have on a typical day when you are drinking? Pt Declined   Q3: How often do you have six or more drinks on one occasion? Pt Declined   Utilities   In the past 12 months has the electric, gas, oil, or water company threatened to shut off services in your home? No   Health Literacy   How often do you need to have someone help you when you read instructions, pamphlets, or other written material from your doctor or pharmacy? Never   OTHER   Name(s) of People in Home Reyes Ward (Spouse)  [phone]

## 2024-10-14 NOTE — ASSESSMENT & PLAN NOTE
Patient has Abnormal Phosphorus: hypophosphatemia. Will continue to monitor electrolytes closely. Will replace the affected electrolytes and repeat labs to be done after interventions completed. The patient's phosphorus results have been reviewed and are listed below.  Recent Labs   Lab 10/14/24  0727   PHOS 1.0*

## 2024-10-14 NOTE — ASSESSMENT & PLAN NOTE
Patient's most recent potassium results are listed below.   Recent Labs     10/14/24  0551 10/14/24  0727 10/14/24  1414   K 3.2* 3.1* 4.1     Plan  - Replete potassium per protocol  - Monitor potassium Every 6 hours  - Patient's hypokalemia is improving

## 2024-10-14 NOTE — SUBJECTIVE & OBJECTIVE
Past Medical History:   Diagnosis Date    Depression     Diabetes mellitus     Hypertension     Mixed hyperlipidemia     Neuropathy        Past Surgical History:   Procedure Laterality Date     SECTION      X2    FOOT SURGERY Left     HYSTERECTOMY      TONSILLECTOMY      WRIST SURGERY Right        Review of patient's allergies indicates:  No Known Allergies    No current facility-administered medications on file prior to encounter.     Current Outpatient Medications on File Prior to Encounter   Medication Sig    gabapentin (NEURONTIN) 300 MG capsule Take 1 capsule (300 mg total) by mouth 3 (three) times daily. (Patient taking differently: Take 300 mg by mouth 3 (three) times daily. Patient reported she has been out of this medication.)    lisinopriL 10 MG tablet Take 1 tablet (10 mg total) by mouth once daily.    ibuprofen (ADVIL,MOTRIN) 800 MG tablet 800 mg. As needed    metFORMIN (GLUCOPHAGE) 1000 MG tablet Take 1 tablet (1,000 mg total) by mouth 2 (two) times daily with meals.    oxyCODONE-acetaminophen (PERCOCET) 5-325 mg per tablet Take 1 tablet by mouth every 4 (four) hours as needed for Pain (take 1 for pain on 1-5; 2 for pain 6-10).    VIMPAT 100 mg Tab Take 100 mg by mouth.     Family History    None       Tobacco Use    Smoking status: Every Day     Current packs/day: 1.00     Types: Cigarettes    Smokeless tobacco: Never   Substance and Sexual Activity    Alcohol use: Not Currently    Drug use: Never    Sexual activity: Not on file     Review of Systems   Constitutional:  Positive for activity change. Negative for appetite change and fever.   Respiratory:  Negative for chest tightness and shortness of breath.    Cardiovascular:  Negative for chest pain and leg swelling.   Gastrointestinal:  Positive for abdominal pain (rlq). Negative for abdominal distention, constipation, diarrhea, nausea and vomiting.   Musculoskeletal:  Negative for arthralgias.        Severe leg/foot pain due to neuropathy    Skin:  Negative for rash and wound.     Objective:     Vital Signs (Most Recent):  Temp: 99.1 °F (37.3 °C) (10/14/24 1617)  Pulse: 97 (10/14/24 1617)  Resp: 18 (10/14/24 1617)  BP: 111/63 (10/14/24 1617)  SpO2: 100 % (10/14/24 1617) Vital Signs (24h Range):  Temp:  [97.8 °F (36.6 °C)-99.1 °F (37.3 °C)] 99.1 °F (37.3 °C)  Pulse:  [] 97  Resp:  [18-23] 18  SpO2:  [97 %-100 %] 100 %  BP: (111-169)/() 111/63     Weight: 68.4 kg (150 lb 12.7 oz)  Body mass index is 24.34 kg/m².     Physical Exam  Vitals and nursing note reviewed.   Constitutional:       Comments: Patient is making short crying/gasping sounds in between words while sitting up in the bed. She appears to be in no acute distress. Significant other at bedside.   HENT:      Head: Normocephalic and atraumatic.      Nose: Nose normal.   Eyes:      Conjunctiva/sclera: Conjunctivae normal.   Cardiovascular:      Rate and Rhythm: Normal rate and regular rhythm.      Pulses: Normal pulses.      Heart sounds: Normal heart sounds. No murmur heard.     No gallop.   Pulmonary:      Effort: Pulmonary effort is normal.      Breath sounds: Normal breath sounds. No wheezing, rhonchi or rales.   Abdominal:      General: Bowel sounds are normal. There is no distension.      Palpations: Abdomen is soft.      Tenderness: There is abdominal tenderness (Patient jumped and expressed discomfort when I palpated her RLQ.  Response appeared to be inconsistent with the rest of the clinical exam.). There is no guarding.   Musculoskeletal:         General: No swelling or deformity. Normal range of motion.      Cervical back: Normal range of motion and neck supple.      Right lower leg: No edema.      Left lower leg: No edema.      Comments: Patient screamed and started crying hysterically when I palpated her ankle for edema. Response was not consistent with the rest of the clinical exam.   Skin:     General: Skin is warm and dry.   Neurological:      General: No focal  deficit present.      Mental Status: She is alert and oriented to person, place, and time. Mental status is at baseline.      Gait: Gait normal.                Significant Labs: All pertinent labs within the past 24 hours have been reviewed.  Recent Lab Results  (Last 5 results in the past 24 hours)        10/14/24  1414   10/14/24  0755   10/14/24  0746   10/14/24  0727   10/14/24  0551        Albumin/Globulin Ratio       0.7         Albumin       2.7         ALP       100         ALT       6         Anion Gap 12.0       10.0   14.0       AST       6         Baso #       0.06         Basophil %       0.6         BILIRUBIN TOTAL       0.4         BUN 12.0       11.0   11.0       BUN/CREAT RATIO 8       8   8       Calcium 9.1       8.5   8.5       Chloride 102       100   99       CO2 20       21   18       Creatinine 1.43       1.36   1.36       eGFR 46       48   48       Eos #       0.01         Eos %       0.1         Globulin, Total       3.8         Glucose 298       415   458  Comment: Critical Result called and verified by verbal readback to: Mesfin  on 10/14/2024 at 06:13. Reported by [id number].       Hematocrit       29.3         Hemoglobin       10.0         Immature Grans (Abs)       0.04         Immature Granulocytes       0.4         Lactic Acid Level   2.8       4.1  Comment: Critical Result called and verified by verbal readback to: Mesfin on 10/14/2024 at 06:13. Reported by [id number].       Lymph #       0.77         LYMPH %       7.1         MCH       28.2         MCHC       34.1         MCV       82.5         Mono #       0.64         Mono %       5.9         MPV       10.7         Neut #       9.31         Neut %       85.9         nRBC       0.0         QRS Duration     84           [facility] QTC Calculation     457           Phosphorus Level       1.0  Comment: Critical Result called and verified by verbal readback to: rosaura encarnacion on 10/14/2024 at 13:23. Reported by [id number].         Platelet Count        238         Potassium 4.1       3.1   3.2       PROTEIN TOTAL       6.5         RBC       3.55         RDW       14.9         Sodium 134       131   131       Troponin I       <0.010         WBC       10.83                                Significant Imaging: I have reviewed all pertinent imaging results/findings within the past 24 hours.

## 2024-10-14 NOTE — ASSESSMENT & PLAN NOTE
UA okay.  On abd ct, indicating recently passed renal stone.   Will monitor I/Os.   RLQ Abd pain improved at rest. Severe pain upon palpation, out of proportion to CT findings.   Pt was started on zosyn in ER. Blood cultures were collected. Can de-escalate if feel no infectious process.

## 2024-10-14 NOTE — HPI
"50 yo CF with PMH of DMT2 off of medications for 1 year due to "not making it a priority", severe peripheral neuropathy, tobacco use-<1ppd since a teenager (likes to smoke and does not want to quit), Pt presented to the ER with RUQ pain. Vitals with htn and tachycardia. Glucose 649, K 2.9, Cr 1.62, CO2 21, Na 128, WBC elevated 16.5, lactic acid 4.2. No ketones in urine. No BOHB ordered due to send out. CXR, ua, EKG, trop all okay. VBG with 7.37 pH. CT abd with prominent but not inflammed appendix, right hydronephrosis without stone indicating recently passed stone. Pt was given insulin, ivf, zosyn, Kcl, troadol.  Labs improving. Hospitalist consulted for admission.     Upon exam, she was lying in bed making gasping/crying sounds with her mouth in between speaking words. She states that her feet are hurting. She also states that her abdominal pain that she had when she arrived at the ER had resolved. She denies constipation. States last bm was 1.5 days ago and that sometimes she goes a week without a bm, but this is normal for her. No nausea. No CP or SOB.   "

## 2024-10-14 NOTE — ED PROVIDER NOTES
Encounter Date: 10/14/2024       History     Chief Complaint   Patient presents with    Abdominal Pain     RLQ abd pain that started 4hrs ago with nausea. Denies vomiting, fever, diarrhea. Last bm yesterday.       RLQ abd pain that started 4hrs ago with nausea. Denies vomiting, fever, diarrhea. Last bm yesterday.    The history is provided by the patient.   Abdominal Pain  The current episode started several hours ago. The abdominal pain is located in the RLQ. The abdominal pain does not radiate. The severity of the abdominal pain is 8/10. The abdominal pain is relieved by nothing.   The patient states that she believes she is currently not pregnant. The patient has not had a change in bowel habit.     Review of patient's allergies indicates:  No Known Allergies  Past Medical History:   Diagnosis Date    Depression     Diabetes mellitus     Hypertension     Mixed hyperlipidemia     Neuropathy      Past Surgical History:   Procedure Laterality Date     SECTION      X2    FOOT SURGERY Left     HYSTERECTOMY      TONSILLECTOMY      WRIST SURGERY Right      No family history on file.  Social History     Tobacco Use    Smoking status: Every Day     Current packs/day: 1.00     Types: Cigarettes    Smokeless tobacco: Never   Substance Use Topics    Alcohol use: Not Currently    Drug use: Never     Review of Systems   Constitutional: Negative.    HENT: Negative.     Eyes: Negative.    Respiratory: Negative.     Cardiovascular: Negative.    Gastrointestinal:  Positive for abdominal pain.   Endocrine: Negative.    Genitourinary: Negative.    Musculoskeletal: Negative.    Skin: Negative.    Allergic/Immunologic: Negative.    Neurological: Negative.    Hematological: Negative.    Psychiatric/Behavioral: Negative.     All other systems reviewed and are negative.      Physical Exam     Initial Vitals [10/14/24 0318]   BP Pulse Resp Temp SpO2   (!) 126/104 (!) 126 (!) 23 97.8 °F (36.6 °C) 100 %      MAP       --          Physical Exam    Abdominal: Abdomen is soft and flat. Bowel sounds are normal. There is abdominal tenderness in the right lower quadrant.           ED Course   Procedures  Labs Reviewed   URINALYSIS, REFLEX TO URINE CULTURE - Abnormal       Result Value    Color, UA Yellow      Appearance, UA Cloudy (*)     Specific Gravity, UA <=1.005      pH, UA 6.0      Protein, UA Negative      Glucose, UA 3+ (*)     Ketones, UA Negative      Blood, UA Trace (*)     Bilirubin, UA Negative      Urobilinogen, UA 0.2      Nitrites, UA Negative      Leukocyte Esterase, UA Negative     URINALYSIS, MICROSCOPIC - Abnormal    Bacteria, UA Few (*)     WBC Clumps, UA Few (*)     RBC, UA 0-5      WBC, UA 21-50 (*)     Squamous Epithelial Cells, UA Few (*)     Narrative:     Urine microscopic results may be inaccurate, <12 cc sample submitted   LACTIC ACID, PLASMA - Abnormal    Lactic Acid Level 4.2 (*)    COMPREHENSIVE METABOLIC PANEL - Abnormal    Sodium 128 (*)     Potassium 2.9 (*)     Chloride 89 (*)     CO2 21 (*)     Glucose 649 (*)     Blood Urea Nitrogen 12.0      Creatinine 1.62 (*)     Calcium 9.8      Protein Total 8.2      Albumin 3.4 (*)     Globulin 4.8 (*)     Albumin/Globulin Ratio 0.7 (*)     Bilirubin Total 0.5            ALT 8      AST 8      eGFR 39      Anion Gap 18.0      BUN/Creatinine Ratio 7     CBC WITH DIFFERENTIAL - Abnormal    WBC 16.50 (*)     RBC 4.22      Hgb 12.0      Hct 35.6 (*)     MCV 84.4      MCH 28.4      MCHC 33.7      RDW 15.0      Platelet 318      MPV 11.1 (*)     Neut % 88.2      Lymph % 5.6      Mono % 4.4      Eos % 0.8      Basophil % 0.5      Lymph # 0.93      Neut # 14.52 (*)     Mono # 0.73      Eos # 0.14      Baso # 0.09      IG# 0.09 (*)     IG% 0.5      NRBC% 0.0     LACTIC ACID, PLASMA - Abnormal    Lactic Acid Level 4.1 (*)    BASIC METABOLIC PANEL - Abnormal    Sodium 131 (*)     Potassium 3.2 (*)     Chloride 99      CO2 18 (*)     Glucose 458 (*)     Blood Urea Nitrogen  11.0      Creatinine 1.36 (*)     BUN/Creatinine Ratio 8      Calcium 8.5      Anion Gap 14.0      eGFR 48     COMPREHENSIVE METABOLIC PANEL - Abnormal    Sodium 131 (*)     Potassium 3.1 (*)     Chloride 100      CO2 21 (*)     Glucose 415 (*)     Blood Urea Nitrogen 11.0      Creatinine 1.36 (*)     Calcium 8.5      Protein Total 6.5      Albumin 2.7 (*)     Globulin 3.8 (*)     Albumin/Globulin Ratio 0.7 (*)     Bilirubin Total 0.4            ALT 6      AST 6      eGFR 48      Anion Gap 10.0      BUN/Creatinine Ratio 8     CBC WITH DIFFERENTIAL - Abnormal    WBC 10.83      RBC 3.55 (*)     Hgb 10.0 (*)     Hct 29.3 (*)     MCV 82.5      MCH 28.2      MCHC 34.1      RDW 14.9      Platelet 238      MPV 10.7 (*)     Neut % 85.9      Lymph % 7.1      Mono % 5.9      Eos % 0.1      Basophil % 0.6      Lymph # 0.77      Neut # 9.31 (*)     Mono # 0.64      Eos # 0.01      Baso # 0.06      IG# 0.04      IG% 0.4      NRBC% 0.0     LACTIC ACID, PLASMA - Abnormal    Lactic Acid Level 2.8 (*)    ISTAT PROCEDURE - Abnormal    POC PH 7.376      POC PCO2 43.3      POC PO2 17 (*)     POC HCO3 25.4      POC BE 0      POC SATURATED O2 23      POC TCO2 27      Sample VENOUS      Site Other      Allens Test N/A     PREGNANCY TEST, URINE RAPID - Normal    hCG Qualitative, Urine Negative     TROPONIN I - Normal    Troponin-I <0.010     CBC W/ AUTO DIFFERENTIAL    Narrative:     The following orders were created for panel order CBC auto differential.  Procedure                               Abnormality         Status                     ---------                               -----------         ------                     CBC with Differential[[id number]]       Abnormal            Final result                 Please view results for these tests on the individual orders.   CBC W/ AUTO DIFFERENTIAL    Narrative:     The following orders were created for panel order CBC auto differential.  Procedure                                Abnormality         Status                     ---------                               -----------         ------                     CBC with Differential[[id number]]       Abnormal            Final result                 Please view results for these tests on the individual orders.   POCT GLUCOSE MONITORING CONTINUOUS    POC Glucose >500          ECG Results              EKG 12-lead (Final result)        Collection Time Result Time QRS Duration OHS QTC Calculation    10/14/24 07:46:58 10/14/24 10:13:22 84 457                     Final result by Interface, Lab In [facility] (10/14/24 10:13:30)                   Narrative:    Test Reason : R10.9,    Vent. Rate : 089 BPM     Atrial Rate : 089 BPM     P-R Int : 128 ms          QRS Dur : 084 ms      QT Int : 376 ms       P-R-T Axes : 058 010 064 degrees     QTc Int : 457 ms    Normal sinus rhythm  Possible Left atrial enlargement  Cannot rule out Anterior infarct ,age undetermined  Abnormal ECG  Confirmed by Enrrique Park MD ([id number]) on 10/14/2024 10:13:21 AM    Referred By: [email or web address]   SELF           Confirmed By:Enrrique Park MD                                  Imaging Results              X-Ray Chest AP Portable (Final result)  Result time 10/14/24 07:28:27      Final result by Steffen Rubio MD (10/14/24 07:28:27)                   Impression:      No acute cardiopulmonary process identified.      Electronically signed by: Steffen Rubio  Date:    10/14/2024  Time:    07:28               Narrative:    EXAMINATION:  XR CHEST AP PORTABLE    CLINICAL HISTORY:  Elevated white blood cell count, unspecified    TECHNIQUE:  One view    COMPARISON:  November 9, 2022.    FINDINGS:  Cardiopericardial silhouette is within normal limits. Lungs are without dense focal or segmental consolidation, congestive process, pleural effusions or pneumothorax.                                       CT Abdomen Pelvis  Without Contrast (Final result)  Result time 10/14/24 06:03:00      Final  result by Steffen Rubio MD (10/14/24 06:03:00)                   Impression:    Impression:    1. There is right hydronephorisis with no right ureteral stone identified. This is suggestive of a recently passed stone with the possibility of an occult stricture not entirely excluded. Correlate with clinical and laboratory parameters as regards further evaluation and follow up to resolution as indicated.    2. Details and other findings as discussed above.    No significant discrepancy with overnight report.      Electronically signed by: Steffen Rubio  Date:    10/14/2024  Time:    06:03               Narrative:      Technique: CT of the abdomen and pelvis was performed with axial images as well as sagittal and coronal reconstruction images without intravenous contrast or oral contrast.    Comparison: November 9, 2022.    Clinical History: RLQ abd pain that started 4hrs ago with nausea. Denies vomiting, fever, diarrhea. Last bm yesterday.    Dosage Information: Automated Exposure Control was utilized 279.88 mGy.cm.    Findings:    Lines and Tubes: None.    Thorax:    Lungs: The visualized lung bases appear unremarkable. No focal infiltrate or consolidation is seen.    Pleura: No effusions or thickening.    Abdomen:    Abdominal Wall: No abdominal wall pathology is seen.    Within limitations of noncontrast technique, no acute findings liver, pancreas spleen identified..    Biliary System: No intrahepatic or extrahepatic biliary duct dilatation is seen.    Gallbladder: The gallbladder lumen is without calcified calculus    Adrenals: The right adrenal gland appears unremarkable. There is stable 1.5 cm hypodense nodule in the left adrenal gland. This could be an adrenal adenoma.    Kidneys: The left kidney appears unremarkable with no stones cysts masses or hydronephrosis. No stoneis seen in the right kidney. There is new moderate right hydronephorisis with no right ureteral stone identified. This is suggestive of a  recently passed stone with the possibility of an occult stricture not entirely excluded.    Aorta: There is mild widely scattered calcification of the abdominal aorta and its branches.    Bowel:    Esophagus: The visualized esophagus appears unremarkable.    Stomach: The stomach appears unremarkable.    Duodenum: Unremarkable appearing duodenum.    Small Bowel: The small bowel appears unremarkable.    Colon: Nondistended.    Appendix: The appendix appears prominent but otherwise unremarkable with no surrounding inflammatory change and is seen on Image 123, Series 2 through Image 129, Series 2.    Pelvis:    Bladder: The bladder is nondistended but appears otherwise unremarkable.    Female:    Uterus: The uterus appears unremarkable for age.    Ovaries: The ovaries appear unremarkable with probable dominant right sided physiologic cysts.    Bony structures:    Dorsal Spine: The visualized dorsal spine appears unremarkable.    Bony Pelvis: The visualized bony structures of the pelvis appear unremarkable.                        Preliminary result by Herman Murillo MD (10/14/24 04:24:41)                   Impression:    1. There is mild right hydronephorisis with no right ureteral stone identified. This is suggestive of a recently passed stone with the possibility of an occult stricture not entirely excluded. Correlate with clinical and laboratory parameters as regards further evaluation and follow up to resolution as indicated.  2. Details and other findings as discussed above.               Narrative:    START OF REPORT:  Technique: CT of the abdomen and pelvis was performed with axial images as well as sagittal and coronal reconstruction images without intravenous contrast or oral contrast.    Comparison: None available.    Clinical History: RLQ abd pain that started 4hrs ago with nausea. Denies vomiting, fever, diarrhea. Last bm yesterday.    Dosage Information: Automated Exposure Control was utilized 279.88  mGy.cm.    Findings:  Lines and Tubes: None.  Thorax:  Lungs: The visualized lung bases appear unremarkable. No focal infiltrate or consolidation is seen.  Pleura: No effusions or thickening.  Heart: The heart size is within normal limits.  Abdomen:  Abdominal Wall: No abdominal wall pathology is seen.  Liver: The liver appears unremarkable.  Biliary System: No intrahepatic or extrahepatic biliary duct dilatation is seen.  Gallbladder: The gallbladder appears unremarkable.  Pancreas: The pancreas appears unremarkable.  Spleen: The spleen appears unremarkable.  Adrenals: The right adrenal gland appears unremarkable. There is a 1.5 cm hypodense nodule in the left adrenal gland. This could be an adrenal adenoma. Correlate with clinical and laboratory parameters as regards additional evaluation and follow up.  Kidneys: The left kidney appears unremarkable with no stones cysts masses or hydronephrosis. No stone, cyst or mass is seen in the right kidney. There is mild right hydronephorisis with no right ureteral stone identified. This is suggestive of a recently passed stone with the possibility of an occult stricture not entirely excluded.  Aorta: There is mild widely scattered calcification of the abdominal aorta and its branches.  IVC: Unremarkable.  Bowel:  Esophagus: The visualized esophagus appears unremarkable.  Stomach: The stomach appears unremarkable.  Duodenum: Unremarkable appearing duodenum.  Small Bowel: The small bowel appears unremarkable.  Colon: Nondistended.  Appendix: The appendix appears prominent but otherwise unremarkable with no surrounding inflammatory change and is seen on Image 123, Series 2 through Image 129, Series 2.    Pelvis:  Bladder: The bladder is nondistended but appears otherwise unremarkable.  Female:  Uterus: The uterus appears unremarkable for age.  Ovaries: The ovaries appear unremarkable with probable dominant right sided physiologic cysts.    Bony structures:  Dorsal Spine: The  visualized dorsal spine appears unremarkable.  Bony Pelvis: The visualized bony structures of the pelvis appear unremarkable.                                         Medications   gabapentin capsule 300 mg (300 mg Oral Given 10/16/24 2020)   lisinopriL tablet 10 mg (10 mg Oral Not Given 10/16/24 0837)   acetaminophen tablet 1,000 mg (1,000 mg Oral Given 10/16/24 0356)   ondansetron injection 4 mg (4 mg Intravenous Given 10/14/24 8194)   melatonin tablet 6 mg (6 mg Oral Given 10/15/24 2019)   potassium chloride SA CR tablet 10 mEq (10 mEq Oral Given 10/16/24 2020)   influenza (Flulaval, Fluzone, Fluarix) 45 mcg/0.5 mL IM vaccine (> or = 6 mo) 0.5 mL (has no administration in time range)   HYDROcodone-acetaminophen 5-325 mg per tablet 1 tablet (1 tablet Oral Given 10/17/24 0315)   traMADoL tablet 50 mg (50 mg Oral Given 10/15/24 0646)   dextrose 10% bolus 125 mL 125 mL (has no administration in time range)   dextrose 10% bolus 250 mL 250 mL (has no administration in time range)   insulin glargine U-100 (Lantus) injection 14 Units (14 Units Subcutaneous Given 10/16/24 2017)   glucose chewable tablet 16 g (has no administration in time range)   glucose chewable tablet 24 g (has no administration in time range)   dextrose 50% injection 12.5 g (has no administration in time range)   dextrose 50% injection 25 g (has no administration in time range)   glucagon (human recombinant) injection 1 mg (has no administration in time range)   insulin aspart U-100 injection 0-15 Units (2 Units Subcutaneous Given 10/16/24 2017)   cefTRIAXone injection 1 g (1 g Intravenous Given 10/16/24 1325)   sterile water for injection injection (  Not Given 10/16/24 1300)   sodium chloride 0.9% bolus 1,000 mL 1,000 mL (0 mLs Intravenous Stopped 10/14/24 0456)   morphine injection 2 mg (2 mg Intravenous Given 10/14/24 1199)   ondansetron injection 4 mg (4 mg Intravenous Given 10/14/24 1287)   insulin regular injection 8 Units 0.08 mL (8 Units  Intravenous Given 10/14/24 0357)   potassium chloride SA CR tablet 40 mEq (40 mEq Oral Given 10/14/24 0419)   potassium chloride 10 mEq in 100 mL IVPB (0 mEq Intravenous Stopped 10/14/24 0533)   sodium chloride 0.9% bolus 1,000 mL 1,000 mL (0 mLs Intravenous Stopped 10/14/24 0551)   ketorolac injection 30 mg (30 mg Intravenous Given 10/14/24 0520)   insulin regular injection 4 Units 0.04 mL (4 Units Intravenous Given 10/14/24 0655)   potassium chloride SA CR tablet 40 mEq (40 mEq Oral Given 10/14/24 1213)   sodium phosphate 30 mmol in 0.9% NaCl 500 mL IVPB (0 mmol Intravenous Stopped 10/14/24 2248)   magnesium sulfate 2g in water 50mL IVPB (premix) (0 g Intravenous Stopped 10/16/24 1200)   sterile water for injection injection (  Given 10/16/24 1300)     Medical Decision Making  Lactic acid has come down slightly from 4.2-4.1 with correction of electrolyte abnormalities after insulin and normal saline 2 L. I believe this patient can easily be managed here with fluid resuscitation as well as sliding scale insulin to slowly lower her blood sugar.  The patient already reports noncompliance with her diabetic therapy because she says it makes her feel bad.  CT scan reveals the presence of a recently passed stone on the right side with some mild residual hydronephrosis.  While there is bacteria present in the urine I do not believe that there is any actual pyuria or severe urinary tract infection.  I believe it is advisable to hydrate the patient and get the patient's blood sugar under control and repeat testing to see if there is any further correction.  Hospitalist service has been notified.  While the white count is elevated, I do not believe antibiotics are required at this time as the elevation in white count is likely reactionary to the high blood sugar.  Additionally, the lactic acid while it has not trend down as much as we would have liked, it is trending down and the body likely needs to adjust.  I do not  believe that there is an acute infectious process at this time to justify any sort of antibiotics.    Amount and/or Complexity of Data Reviewed  Labs: ordered.  Radiology: ordered.    Risk  OTC drugs.  Prescription drug management.                                      Clinical Impression:  Final diagnoses:  [D72.829] Leukocytosis (Primary)  [R10.9] Abdominal pain  [E86.0] Dehydration          ED Disposition Condition    Observation Stable                Venkat Sewell MD  10/14/24 0711       Venkat Sewell MD  10/17/24 0837

## 2024-10-14 NOTE — ASSESSMENT & PLAN NOTE
KERRIE is likely due to pre-renal azotemia due to intravascular volume depletion. Baseline creatinine is 0.72. Most recent creatinine and eGFR are listed below.  Recent Labs     10/14/24  0551 10/14/24  0727 10/14/24  1414   CREATININE 1.36* 1.36* 1.43*   EGFRNORACEVR 48 48 46      Plan  - KERRIE is improving  - Avoid nephrotoxins and renally dose meds for GFR listed above  - Monitor urine output, serial BMP, and adjust therapy as needed

## 2024-10-14 NOTE — PLAN OF CARE
Problem: Adult Inpatient Plan of Care  Goal: Plan of Care Review  Outcome: Progressing  Goal: Patient-Specific Goal (Individualized)  Outcome: Progressing  Goal: Absence of Hospital-Acquired Illness or Injury  Outcome: Progressing  Goal: Optimal Comfort and Wellbeing  Outcome: Progressing  Goal: Readiness for Transition of Care  Outcome: Progressing     Problem: Comorbidity Management  Goal: Blood Pressure in Desired Range  Outcome: Progressing     Problem: Pain Acute  Goal: Optimal Pain Control and Function  Outcome: Progressing     Problem: Fatigue  Goal: Improved Activity Tolerance  Outcome: Progressing     Problem: Infection  Goal: Absence of Infection Signs and Symptoms  Outcome: Progressing     Problem: Fluid Volume Deficit  Goal: Fluid Balance  Outcome: Progressing     Problem: Fall Injury Risk  Goal: Absence of Fall and Fall-Related Injury  Outcome: Progressing     Problem: Electrolyte Imbalance  Goal: Electrolyte Balance  Outcome: Progressing     Problem: Diabetes Comorbidity  Goal: Blood Glucose Level Within Targeted Range  Outcome: Progressing     Problem: Depression  Goal: Improved Mood  Outcome: Progressing

## 2024-10-14 NOTE — ED PROVIDER NOTES
Encounter Date: 10/14/2024       History     Chief Complaint   Patient presents with    Abdominal Pain     RLQ abd pain that started 4hrs ago with nausea. Denies vomiting, fever, diarrhea. Last bm yesterday.      HPI  Review of patient's allergies indicates:  No Known Allergies  Past Medical History:   Diagnosis Date    Depression     Diabetes mellitus     Hypertension     Mixed hyperlipidemia     Neuropathy      Past Surgical History:   Procedure Laterality Date     SECTION      X2    FOOT SURGERY Left     HYSTERECTOMY      TONSILLECTOMY      WRIST SURGERY Right      No family history on file.  Social History     Tobacco Use    Smoking status: Every Day     Current packs/day: 1.00     Types: Cigarettes    Smokeless tobacco: Never   Substance Use Topics    Alcohol use: Not Currently    Drug use: Never     Review of Systems    Physical Exam     Initial Vitals [10/14/24 0318]   BP Pulse Resp Temp SpO2   (!) 126/104 (!) 126 (!) 23 97.8 °F (36.6 °C) 100 %      MAP       --         Physical Exam    ED Course   Critical Care    Date/Time: 10/14/2024 8:27 AM    Performed by: Milo Medina MD  Authorized by: Milo Medina MD  Direct patient critical care time: 20 minutes  Ordering / reviewing critical care time: 10 minutes  Documentation critical care time: 10 minutes  Consulting other physicians critical care time: 5 minutes  Total critical care time (exclusive of procedural time) : 45 minutes  Critical care was necessary to treat or prevent imminent or life-threatening deterioration of the following conditions: dehydration and metabolic crisis.  Critical care was time spent personally by me on the following activities: development of treatment plan with patient or surrogate, discussions with primary provider, interpretation of cardiac output measurements, evaluation of patient's response to treatment, examination of patient, obtaining history from patient or surrogate, ordering and performing treatments and  interventions, ordering and review of laboratory studies, ordering and review of radiographic studies, pulse oximetry and re-evaluation of patient's condition.        Labs Reviewed   URINALYSIS, REFLEX TO URINE CULTURE - Abnormal       Result Value    Color, UA Yellow      Appearance, UA Cloudy (*)     Specific Gravity, UA <=1.005      pH, UA 6.0      Protein, UA Negative      Glucose, UA 3+ (*)     Ketones, UA Negative      Blood, UA Trace (*)     Bilirubin, UA Negative      Urobilinogen, UA 0.2      Nitrites, UA Negative      Leukocyte Esterase, UA Negative     URINALYSIS, MICROSCOPIC - Abnormal    Bacteria, UA Few (*)     WBC Clumps, UA Few (*)     RBC, UA 0-5      WBC, UA 21-50 (*)     Squamous Epithelial Cells, UA Few (*)     Narrative:     Urine microscopic results may be inaccurate, <12 cc sample submitted   LACTIC ACID, PLASMA - Abnormal    Lactic Acid Level 4.2 (*)    COMPREHENSIVE METABOLIC PANEL - Abnormal    Sodium 128 (*)     Potassium 2.9 (*)     Chloride 89 (*)     CO2 21 (*)     Glucose 649 (*)     Blood Urea Nitrogen 12.0      Creatinine 1.62 (*)     Calcium 9.8      Protein Total 8.2      Albumin 3.4 (*)     Globulin 4.8 (*)     Albumin/Globulin Ratio 0.7 (*)     Bilirubin Total 0.5            ALT 8      AST 8      eGFR 39      Anion Gap 18.0      BUN/Creatinine Ratio 7     CBC WITH DIFFERENTIAL - Abnormal    WBC 16.50 (*)     RBC 4.22      Hgb 12.0      Hct 35.6 (*)     MCV 84.4      MCH 28.4      MCHC 33.7      RDW 15.0      Platelet 318      MPV 11.1 (*)     Neut % 88.2      Lymph % 5.6      Mono % 4.4      Eos % 0.8      Basophil % 0.5      Lymph # 0.93      Neut # 14.52 (*)     Mono # 0.73      Eos # 0.14      Baso # 0.09      IG# 0.09 (*)     IG% 0.5      NRBC% 0.0     LACTIC ACID, PLASMA - Abnormal    Lactic Acid Level 4.1 (*)    BASIC METABOLIC PANEL - Abnormal    Sodium 131 (*)     Potassium 3.2 (*)     Chloride 99      CO2 18 (*)     Glucose 458 (*)     Blood Urea Nitrogen 11.0       Creatinine 1.36 (*)     BUN/Creatinine Ratio 8      Calcium 8.5      Anion Gap 14.0      eGFR 48     COMPREHENSIVE METABOLIC PANEL - Abnormal    Sodium 131 (*)     Potassium 3.1 (*)     Chloride 100      CO2 21 (*)     Glucose 415 (*)     Blood Urea Nitrogen 11.0      Creatinine 1.36 (*)     Calcium 8.5      Protein Total 6.5      Albumin 2.7 (*)     Globulin 3.8 (*)     Albumin/Globulin Ratio 0.7 (*)     Bilirubin Total 0.4            ALT 6      AST 6      eGFR 48      Anion Gap 10.0      BUN/Creatinine Ratio 8     CBC WITH DIFFERENTIAL - Abnormal    WBC 10.83      RBC 3.55 (*)     Hgb 10.0 (*)     Hct 29.3 (*)     MCV 82.5      MCH 28.2      MCHC 34.1      RDW 14.9      Platelet 238      MPV 10.7 (*)     Neut % 85.9      Lymph % 7.1      Mono % 5.9      Eos % 0.1      Basophil % 0.6      Lymph # 0.77      Neut # 9.31 (*)     Mono # 0.64      Eos # 0.01      Baso # 0.06      IG# 0.04      IG% 0.4      NRBC% 0.0     LACTIC ACID, PLASMA - Abnormal    Lactic Acid Level 2.8 (*)    ISTAT PROCEDURE - Abnormal    POC PH 7.376      POC PCO2 43.3      POC PO2 17 (*)     POC HCO3 25.4      POC BE 0      POC SATURATED O2 23      POC TCO2 27      Sample VENOUS      Site Other      Allens Test N/A     PREGNANCY TEST, URINE RAPID - Normal    hCG Qualitative, Urine Negative     TROPONIN I - Normal    Troponin-I <0.010     CULTURE, URINE   CBC W/ AUTO DIFFERENTIAL    Narrative:     The following orders were created for panel order CBC auto differential.  Procedure                               Abnormality         Status                     ---------                               -----------         ------                     CBC with Differential[[id number]]       Abnormal            Final result                 Please view results for these tests on the individual orders.   CBC W/ AUTO DIFFERENTIAL    Narrative:     The following orders were created for panel order CBC auto differential.  Procedure                                Abnormality         Status                     ---------                               -----------         ------                     CBC with Differential[[id number]]       Abnormal            Final result                 Please view results for these tests on the individual orders.   PHOSPHORUS   POCT GLUCOSE MONITORING CONTINUOUS    POC Glucose >500            Imaging Results              X-Ray Chest AP Portable (Final result)  Result time 10/14/24 07:28:27      Final result by Steffen Rubio MD (10/14/24 07:28:27)                   Impression:      No acute cardiopulmonary process identified.      Electronically signed by: Steffen Rubio  Date:    10/14/2024  Time:    07:28               Narrative:    EXAMINATION:  XR CHEST AP PORTABLE    CLINICAL HISTORY:  Elevated white blood cell count, unspecified    TECHNIQUE:  One view    COMPARISON:  November 9, 2022.    FINDINGS:  Cardiopericardial silhouette is within normal limits. Lungs are without dense focal or segmental consolidation, congestive process, pleural effusions or pneumothorax.                                       CT Abdomen Pelvis  Without Contrast (Final result)  Result time 10/14/24 06:03:00      Final result by Steffen Rubio MD (10/14/24 06:03:00)                   Impression:    Impression:    1. There is right hydronephorisis with no right ureteral stone identified. This is suggestive of a recently passed stone with the possibility of an occult stricture not entirely excluded. Correlate with clinical and laboratory parameters as regards further evaluation and follow up to resolution as indicated.    2. Details and other findings as discussed above.    No significant discrepancy with overnight report.      Electronically signed by: Steffen Rubio  Date:    10/14/2024  Time:    06:03               Narrative:      Technique: CT of the abdomen and pelvis was performed with axial images as well as sagittal and coronal reconstruction images  without intravenous contrast or oral contrast.    Comparison: November 9, 2022.    Clinical History: RLQ abd pain that started 4hrs ago with nausea. Denies vomiting, fever, diarrhea. Last bm yesterday.    Dosage Information: Automated Exposure Control was utilized 279.88 mGy.cm.    Findings:    Lines and Tubes: None.    Thorax:    Lungs: The visualized lung bases appear unremarkable. No focal infiltrate or consolidation is seen.    Pleura: No effusions or thickening.    Abdomen:    Abdominal Wall: No abdominal wall pathology is seen.    Within limitations of noncontrast technique, no acute findings liver, pancreas spleen identified..    Biliary System: No intrahepatic or extrahepatic biliary duct dilatation is seen.    Gallbladder: The gallbladder lumen is without calcified calculus    Adrenals: The right adrenal gland appears unremarkable. There is stable 1.5 cm hypodense nodule in the left adrenal gland. This could be an adrenal adenoma.    Kidneys: The left kidney appears unremarkable with no stones cysts masses or hydronephrosis. No stoneis seen in the right kidney. There is new moderate right hydronephorisis with no right ureteral stone identified. This is suggestive of a recently passed stone with the possibility of an occult stricture not entirely excluded.    Aorta: There is mild widely scattered calcification of the abdominal aorta and its branches.    Bowel:    Esophagus: The visualized esophagus appears unremarkable.    Stomach: The stomach appears unremarkable.    Duodenum: Unremarkable appearing duodenum.    Small Bowel: The small bowel appears unremarkable.    Colon: Nondistended.    Appendix: The appendix appears prominent but otherwise unremarkable with no surrounding inflammatory change and is seen on Image 123, Series 2 through Image 129, Series 2.    Pelvis:    Bladder: The bladder is nondistended but appears otherwise unremarkable.    Female:    Uterus: The uterus appears unremarkable for  age.    Ovaries: The ovaries appear unremarkable with probable dominant right sided physiologic cysts.    Bony structures:    Dorsal Spine: The visualized dorsal spine appears unremarkable.    Bony Pelvis: The visualized bony structures of the pelvis appear unremarkable.                        Preliminary result by Herman Murillo MD (10/14/24 04:24:41)                   Impression:    1. There is mild right hydronephorisis with no right ureteral stone identified. This is suggestive of a recently passed stone with the possibility of an occult stricture not entirely excluded. Correlate with clinical and laboratory parameters as regards further evaluation and follow up to resolution as indicated.  2. Details and other findings as discussed above.               Narrative:    START OF REPORT:  Technique: CT of the abdomen and pelvis was performed with axial images as well as sagittal and coronal reconstruction images without intravenous contrast or oral contrast.    Comparison: None available.    Clinical History: RLQ abd pain that started 4hrs ago with nausea. Denies vomiting, fever, diarrhea. Last bm yesterday.    Dosage Information: Automated Exposure Control was utilized 279.88 mGy.cm.    Findings:  Lines and Tubes: None.  Thorax:  Lungs: The visualized lung bases appear unremarkable. No focal infiltrate or consolidation is seen.  Pleura: No effusions or thickening.  Heart: The heart size is within normal limits.  Abdomen:  Abdominal Wall: No abdominal wall pathology is seen.  Liver: The liver appears unremarkable.  Biliary System: No intrahepatic or extrahepatic biliary duct dilatation is seen.  Gallbladder: The gallbladder appears unremarkable.  Pancreas: The pancreas appears unremarkable.  Spleen: The spleen appears unremarkable.  Adrenals: The right adrenal gland appears unremarkable. There is a 1.5 cm hypodense nodule in the left adrenal gland. This could be an adrenal adenoma. Correlate with clinical and  laboratory parameters as regards additional evaluation and follow up.  Kidneys: The left kidney appears unremarkable with no stones cysts masses or hydronephrosis. No stone, cyst or mass is seen in the right kidney. There is mild right hydronephorisis with no right ureteral stone identified. This is suggestive of a recently passed stone with the possibility of an occult stricture not entirely excluded.  Aorta: There is mild widely scattered calcification of the abdominal aorta and its branches.  IVC: Unremarkable.  Bowel:  Esophagus: The visualized esophagus appears unremarkable.  Stomach: The stomach appears unremarkable.  Duodenum: Unremarkable appearing duodenum.  Small Bowel: The small bowel appears unremarkable.  Colon: Nondistended.  Appendix: The appendix appears prominent but otherwise unremarkable with no surrounding inflammatory change and is seen on Image 123, Series 2 through Image 129, Series 2.    Pelvis:  Bladder: The bladder is nondistended but appears otherwise unremarkable.  Female:  Uterus: The uterus appears unremarkable for age.  Ovaries: The ovaries appear unremarkable with probable dominant right sided physiologic cysts.    Bony structures:  Dorsal Spine: The visualized dorsal spine appears unremarkable.  Bony Pelvis: The visualized bony structures of the pelvis appear unremarkable.                                         Medications   sodium chloride 0.9% bolus 1,000 mL 1,000 mL (0 mLs Intravenous Stopped 10/14/24 8216)   morphine injection 2 mg (2 mg Intravenous Given 10/14/24 0339)   ondansetron injection 4 mg (4 mg Intravenous Given 10/14/24 0345)   insulin regular injection 8 Units 0.08 mL (8 Units Intravenous Given 10/14/24 8717)   potassium chloride SA CR tablet 40 mEq (40 mEq Oral Given 10/14/24 8592)   potassium chloride 10 mEq in 100 mL IVPB (0 mEq Intravenous Stopped 10/14/24 3129)   sodium chloride 0.9% bolus 1,000 mL 1,000 mL (0 mLs Intravenous Stopped 10/14/24 7151)   ketorolac  injection 30 mg (30 mg Intravenous Given 10/14/24 0520)   insulin regular injection 4 Units 0.04 mL (4 Units Intravenous Given 10/14/24 0655)     Medical Decision Making  [place] of care note:    Pt is a 49 y.o. female complaining of abdominal pain. Their evaluation was initiated by previous ER physician and turned over to my care at shift change. I have examined the patient and agree with previous documentation.    Pt with leukocytosis, rlq pain, possible passed kidney stone, hyperglycemia, hypokalemia. Hospitalist consulted by previous ER MD, recommended repeat labs, EKG and will consider appropriate disposition. Elevated lactic acid and leukocytosis suspected due to deyhdration and hyperglycemia. No infection found.    Pt examined by me, abdominal pain resolved, abd non tender.    /85  T 97.8 P 96    Milo Medina MD  7:50 AM 10/14/2024    0815. Pt accepted by Dr. Isbell.    Amount and/or Complexity of Data Reviewed  Labs: ordered.     Details: CBC with WBC 16k, CMP with hypokalemia of 2.9, . No acidosis noted on ABG, no DKA.  Radiology: ordered.     Details: CT abd/pel: prob passed right renal stone.     Risk  OTC drugs.  Prescription drug management.                                      Clinical Impression:  Final diagnoses:  [D72.829] Leukocytosis (Primary)  [R10.9] Abdominal pain  [E86.0] Dehydration          ED Disposition Condition    Observation                 Milo Medina MD  10/14/24 0828       Milo Medina MD  10/14/24 0829

## 2024-10-15 LAB
ALBUMIN SERPL-MCNC: 2.4 G/DL (ref 3.5–5)
ALBUMIN/GLOB SERPL: 0.7 RATIO (ref 1.1–2)
ALP SERPL-CCNC: 111 UNIT/L (ref 40–150)
ALT SERPL-CCNC: 8 UNIT/L (ref 0–55)
ANION GAP SERPL CALC-SCNC: 8 MEQ/L
AST SERPL-CCNC: 15 UNIT/L (ref 5–34)
BASOPHILS # BLD AUTO: 0.05 X10(3)/MCL
BASOPHILS NFR BLD AUTO: 0.5 %
BILIRUB SERPL-MCNC: 0.4 MG/DL
BUN SERPL-MCNC: 10 MG/DL (ref 7–18.7)
CALCIUM SERPL-MCNC: 8 MG/DL (ref 8.4–10.2)
CHLORIDE SERPL-SCNC: 107 MMOL/L (ref 98–107)
CO2 SERPL-SCNC: 20 MMOL/L (ref 22–29)
CREAT SERPL-MCNC: 0.92 MG/DL (ref 0.55–1.02)
CREAT/UREA NIT SERPL: 11
EOSINOPHIL # BLD AUTO: 0.03 X10(3)/MCL (ref 0–0.9)
EOSINOPHIL NFR BLD AUTO: 0.3 %
ERYTHROCYTE [DISTWIDTH] IN BLOOD BY AUTOMATED COUNT: 15.2 % (ref 11.5–17)
GFR SERPLBLD CREATININE-BSD FMLA CKD-EPI: >60 ML/MIN/1.73/M2
GLOBULIN SER-MCNC: 3.5 GM/DL (ref 2.4–3.5)
GLUCOSE SERPL-MCNC: 193 MG/DL (ref 74–100)
HCT VFR BLD AUTO: 28.7 % (ref 37–47)
HGB BLD-MCNC: 9.6 G/DL (ref 12–16)
IMM GRANULOCYTES # BLD AUTO: 0.05 X10(3)/MCL (ref 0–0.04)
IMM GRANULOCYTES NFR BLD AUTO: 0.5 %
LYMPHOCYTES # BLD AUTO: 1.05 X10(3)/MCL (ref 0.6–4.6)
LYMPHOCYTES NFR BLD AUTO: 10.1 %
MCH RBC QN AUTO: 28.6 PG (ref 27–31)
MCHC RBC AUTO-ENTMCNC: 33.4 G/DL (ref 33–36)
MCV RBC AUTO: 85.4 FL (ref 80–94)
MONOCYTES # BLD AUTO: 0.62 X10(3)/MCL (ref 0.1–1.3)
MONOCYTES NFR BLD AUTO: 6 %
NEUTROPHILS # BLD AUTO: 8.58 X10(3)/MCL (ref 2.1–9.2)
NEUTROPHILS NFR BLD AUTO: 82.6 %
NRBC BLD AUTO-RTO: 0 %
PHOSPHATE SERPL-MCNC: 2.2 MG/DL (ref 2.3–4.7)
PLATELET # BLD AUTO: 226 X10(3)/MCL (ref 130–400)
PMV BLD AUTO: 11.4 FL (ref 7.4–10.4)
POCT GLUCOSE: 155 MG/DL (ref 70–110)
POCT GLUCOSE: 192 MG/DL (ref 70–110)
POCT GLUCOSE: 192 MG/DL (ref 70–110)
POCT GLUCOSE: 215 MG/DL (ref 70–110)
POCT GLUCOSE: >500 MG/DL (ref 70–110)
POTASSIUM SERPL-SCNC: 3.8 MMOL/L (ref 3.5–5.1)
PROT SERPL-MCNC: 5.9 GM/DL (ref 6.4–8.3)
RBC # BLD AUTO: 3.36 X10(6)/MCL (ref 4.2–5.4)
SODIUM SERPL-SCNC: 135 MMOL/L (ref 136–145)
WBC # BLD AUTO: 10.38 X10(3)/MCL (ref 4.5–11.5)

## 2024-10-15 PROCEDURE — 36415 COLL VENOUS BLD VENIPUNCTURE: CPT | Performed by: FAMILY MEDICINE

## 2024-10-15 PROCEDURE — 63600175 PHARM REV CODE 636 W HCPCS: Performed by: INTERNAL MEDICINE

## 2024-10-15 PROCEDURE — 85025 COMPLETE CBC W/AUTO DIFF WBC: CPT | Performed by: FAMILY MEDICINE

## 2024-10-15 PROCEDURE — 80053 COMPREHEN METABOLIC PANEL: CPT | Performed by: FAMILY MEDICINE

## 2024-10-15 PROCEDURE — 25000003 PHARM REV CODE 250: Performed by: INTERNAL MEDICINE

## 2024-10-15 PROCEDURE — 84100 ASSAY OF PHOSPHORUS: CPT | Performed by: INTERNAL MEDICINE

## 2024-10-15 PROCEDURE — 94761 N-INVAS EAR/PLS OXIMETRY MLT: CPT

## 2024-10-15 PROCEDURE — G0378 HOSPITAL OBSERVATION PER HR: HCPCS

## 2024-10-15 PROCEDURE — 96361 HYDRATE IV INFUSION ADD-ON: CPT

## 2024-10-15 PROCEDURE — 96372 THER/PROPH/DIAG INJ SC/IM: CPT | Performed by: INTERNAL MEDICINE

## 2024-10-15 PROCEDURE — 25000003 PHARM REV CODE 250: Performed by: FAMILY MEDICINE

## 2024-10-15 RX ADMIN — POTASSIUM CHLORIDE 10 MEQ: 750 TABLET, EXTENDED RELEASE ORAL at 08:10

## 2024-10-15 RX ADMIN — LISINOPRIL 10 MG: 5 TABLET ORAL at 08:10

## 2024-10-15 RX ADMIN — INSULIN ASPART 4 UNITS: 100 INJECTION, SOLUTION INTRAVENOUS; SUBCUTANEOUS at 08:10

## 2024-10-15 RX ADMIN — GABAPENTIN 300 MG: 300 CAPSULE ORAL at 08:10

## 2024-10-15 RX ADMIN — ACETAMINOPHEN 1000 MG: 500 TABLET, FILM COATED ORAL at 05:10

## 2024-10-15 RX ADMIN — Medication 6 MG: at 08:10

## 2024-10-15 RX ADMIN — INSULIN ASPART 3 UNITS: 100 INJECTION, SOLUTION INTRAVENOUS; SUBCUTANEOUS at 10:10

## 2024-10-15 RX ADMIN — HYDROCODONE BITARTRATE AND ACETAMINOPHEN 1 TABLET: 5; 325 TABLET ORAL at 04:10

## 2024-10-15 RX ADMIN — ACETAMINOPHEN 1000 MG: 500 TABLET, FILM COATED ORAL at 04:10

## 2024-10-15 RX ADMIN — INSULIN ASPART 3 UNITS: 100 INJECTION, SOLUTION INTRAVENOUS; SUBCUTANEOUS at 05:10

## 2024-10-15 RX ADMIN — GABAPENTIN 300 MG: 300 CAPSULE ORAL at 03:10

## 2024-10-15 RX ADMIN — INSULIN ASPART 3 UNITS: 100 INJECTION, SOLUTION INTRAVENOUS; SUBCUTANEOUS at 06:10

## 2024-10-15 RX ADMIN — INSULIN GLARGINE 14 UNITS: 100 INJECTION, SOLUTION SUBCUTANEOUS at 08:10

## 2024-10-15 RX ADMIN — TRAMADOL HYDROCHLORIDE 50 MG: 50 TABLET, COATED ORAL at 06:10

## 2024-10-15 NOTE — HOSPITAL COURSE
10/15/24-Patient is feeling better but still not eating much.  I discussed with her the importance of taking her meds and eating appropriately.  Will monitor today and hopefully d/c in am.    10/16/24-Patient is still not feeling well.  Her Mag dropped today which will require replacement and she started with a fever.  On admit she had a possible UTI which only had a colony count of 75,000.  Will go ahead and treat this now that she has fever.      10/17/24-Patient is feeling better overall.  She is c/o pain to her legs but she has severe neuropathy from her uncontrolled DM and non-compliance with meds.  She was started back on gabapentin and taken off her metformin and started on Lantus.  She is stable for discharge home.  Compliance has been reinforced.  She is stable for discharge home.  Will also set her up with a PCP.  Exam(A&O, NAD, RRR, CTA, BS +, NTTP, ROM I)

## 2024-10-15 NOTE — SUBJECTIVE & OBJECTIVE
Interval History:     Review of Systems   Constitutional:  Positive for activity change.   HENT: Negative.     Eyes: Negative.    Respiratory: Negative.     Cardiovascular: Negative.    Gastrointestinal: Negative.    Endocrine: Negative.    Genitourinary: Negative.    Musculoskeletal:  Positive for myalgias.   Skin: Negative.    Neurological:  Positive for numbness.   Hematological: Negative.    Psychiatric/Behavioral: Negative.       Objective:     Vital Signs (Most Recent):  Temp: 98.7 °F (37.1 °C) (10/15/24 0700)  Pulse: 94 (10/15/24 0700)  Resp: 20 (10/15/24 0646)  BP: (!) 81/55 (10/15/24 0700)  SpO2: 95 % (10/15/24 0700) Vital Signs (24h Range):  Temp:  [98.7 °F (37.1 °C)-101.5 °F (38.6 °C)] 98.7 °F (37.1 °C)  Pulse:  [] 94  Resp:  [18-20] 20  SpO2:  [94 %-100 %] 95 %  BP: ()/(55-84) 81/55     Weight: 68.4 kg (150 lb 12.7 oz)  Body mass index is 24.34 kg/m².  No intake or output data in the 24 hours ending 10/15/24 1229      Physical Exam  Constitutional:       Appearance: Normal appearance. She is normal weight.   HENT:      Head: Normocephalic and atraumatic.      Nose: Nose normal.      Mouth/Throat:      Mouth: Mucous membranes are moist.      Pharynx: Oropharynx is clear.   Eyes:      Extraocular Movements: Extraocular movements intact.      Conjunctiva/sclera: Conjunctivae normal.      Pupils: Pupils are equal, round, and reactive to light.   Cardiovascular:      Rate and Rhythm: Normal rate and regular rhythm.      Pulses: Normal pulses.      Heart sounds: Normal heart sounds.   Pulmonary:      Effort: Pulmonary effort is normal.      Breath sounds: Normal breath sounds.   Abdominal:      General: Bowel sounds are normal.      Palpations: Abdomen is soft.   Musculoskeletal:         General: Normal range of motion.      Cervical back: Normal range of motion and neck supple.   Skin:     General: Skin is warm and dry.      Capillary Refill: Capillary refill takes 2 to 3 seconds.   Neurological:  "     General: No focal deficit present.      Mental Status: She is alert and oriented to person, place, and time. Mental status is at baseline.   Psychiatric:         Mood and Affect: Mood normal.         Behavior: Behavior normal.         Thought Content: Thought content normal.         Judgment: Judgment normal.             Significant Labs: All pertinent labs within the past 24 hours have been reviewed.  BMP:   Recent Labs   Lab 10/15/24  0410   *   K 3.8      CO2 20*   BUN 10.0   CREATININE 0.92   CALCIUM 8.0*     CBC:   Recent Labs   Lab 10/14/24  0338 10/14/24  0727 10/15/24  0410   WBC 16.50* 10.83 10.38   HGB 12.0 10.0* 9.6*   HCT 35.6* 29.3* 28.7*    238 226     CMP:   Recent Labs   Lab 10/14/24  0338 10/14/24  0551 10/14/24  0727 10/14/24  1414 10/15/24  0410   *   < > 131* 134* 135*   K 2.9*   < > 3.1* 4.1 3.8   CL 89*   < > 100 102 107   CO2 21*   < > 21* 20* 20*   BUN 12.0   < > 11.0 12.0 10.0   CREATININE 1.62*   < > 1.36* 1.43* 0.92   CALCIUM 9.8   < > 8.5 9.1 8.0*   ALBUMIN 3.4*  --  2.7*  --  2.4*   BILITOT 0.5  --  0.4  --  0.4   ALKPHOS 138  --  100  --  111   AST 8  --  6  --  15   ALT 8  --  6  --  8    < > = values in this interval not displayed.     Magnesium: No results for input(s): "MG" in the last 48 hours.    Significant Imaging: I have reviewed all pertinent imaging results/findings within the past 24 hours.  "

## 2024-10-15 NOTE — PROGRESS NOTES
Inpatient Nutrition Assessment    Admit Date: 10/14/2024   Total duration of encounter: 1 day   Patient Age: 49 y.o.    Nutrition Recommendation/Prescription     Continue 2000 kcal Diabetic diet as tolerated.  Add Boost Glucose Control TID (250 kcal and 14 g pro per serving).  Medical management of blood glucose.   Replenish electrolytes as medically appropriate.  Bowel regimen per MD.  Encourage po intake and honor pt food prefs as able.  Will continue to monitor wt, labs, and po intake.    Communication of Recommendations:  EMR    Nutrition Assessment     Malnutrition Assessment/Nutrition-Focused Physical Exam       Malnutrition Level:  (unable to eval. will perform NFPE upon early f/u) (10/15/24 5732)                                                        A minimum of two characteristics is recommended for diagnosis of either severe or non-severe malnutrition.    Chart Review    Reason Seen: continuous nutrition monitoring    Malnutrition Screening Tool Results   Have you recently lost weight without trying?: No  Have you been eating poorly because of a decreased appetite?: No   MST Score: 0   Diagnosis:  Type 2 diabetes mellitus with hyperglycemia, without long-term current use of insulin  KERRIE (acute kidney injury)  Hydronephrosis of right kidney  Tobacco dependence  Dehydration  Hypophosphatemia  Hypokalemia    Relevant Medical History:    Depression      Diabetes mellitus      Hypertension      Mixed hyperlipidemia      Neuropathy        Scheduled Medications:  gabapentin, 300 mg, TID  insulin glargine U-100, 14 Units, QHS  lisinopriL, 10 mg, Daily  potassium chloride, 10 mEq, BID    Continuous Infusions:   PRN Medications:  acetaminophen, 1,000 mg, Q6H PRN  dextrose 10%, 12.5 g, PRN  dextrose 10%, 25 g, PRN  dextrose 50%, 12.5 g, PRN  dextrose 50%, 25 g, PRN  glucagon (human recombinant), 1 mg, PRN  glucose, 16 g, PRN  glucose, 24 g, PRN  HYDROcodone-acetaminophen, 1 tablet, Q6H PRN  influenza, 0.5 mL,  "vaccine x 1 dose  insulin aspart U-100, 0-15 Units, QID (AC + HS) PRN  melatonin, 6 mg, Nightly PRN  ondansetron, 4 mg, Q6H PRN  traMADoL, 50 mg, Q6H PRN    Calorie Containing IV Medications: no significant kcals from medications at this time    Recent Labs   Lab 10/14/24  0338 10/14/24  0551 10/14/24  0727 10/14/24  1414 10/15/24  0410   * 131* 131* 134* 135*   K 2.9* 3.2* 3.1* 4.1 3.8   CALCIUM 9.8 8.5 8.5 9.1 8.0*   PHOS  --   --  1.0*  --   --    CL 89* 99 100 102 107   CO2 21* 18* 21* 20* 20*   BUN 12.0 11.0 11.0 12.0 10.0   CREATININE 1.62* 1.36* 1.36* 1.43* 0.92   EGFRNORACEVR 39 48 48 46 >60   GLUCOSE 649* 458* 415* 298* 193*   BILITOT 0.5  --  0.4  --  0.4   ALKPHOS 138  --  100  --  111   ALT 8  --  6  --  8   AST 8  --  6  --  15   ALBUMIN 3.4*  --  2.7*  --  2.4*   HGBA1C  --   --  13.3*  --   --    WBC 16.50*  --  10.83  --  10.38   HGB 12.0  --  10.0*  --  9.6*   HCT 35.6*  --  29.3*  --  28.7*     Nutrition Orders:  Diet diabetic 2000 Calories (up to 75 gm per meal)      Appetite/Oral Intake: fair/50-75% of meals  Factors Affecting Nutritional Intake: abdominal pain, decreased appetite, nausea, and pain  Social Needs Impacting Access to Food: unable to assess at this time; will attempt on follow-up  Food/Gnosticism/Cultural Preferences: unable to obtain  Food Allergies: no known food allergies  Last Bowel Movement: 10/12/24  Wound(s):  no partial or full thickness pressure injuries documented    Comments    10/15/24: Pt appetite and intake fair. 75% avg po intake x last 2 meals recorded per EMR. Last BM noted 3 days ago. Pt sometimes goes a week w/o a BM. Pt has not been taking diabetes medication for the past year. Per EMR, pt has a 25# wt loss in the last 3 months (14%-significant). Will complete early f/u tmrw to perform NFPE. Will add Boost GC TID to help meet est needs. Encourage po intake and honor food prefs as able.     Anthropometrics    Height: 5' 6" (167.6 cm), Height Method: " Stated  Last Weight: 68.4 kg (150 lb 12.7 oz) (10/14/24 0318), Weight Method: Bed Scale  BMI (Calculated): 24.4  BMI Classification: normal (BMI 18.5-24.9)     Ideal Body Weight (IBW), Female: 130 lb     % Ideal Body Weight, Female (lb): 116 %                             Usual Weight Provided By: EMR weight history    Wt Readings from Last 5 Encounters:   10/14/24 68.4 kg (150 lb 12.7 oz)   07/17/24 79.4 kg (175 lb)   06/02/23 58.7 kg (129 lb 4.8 oz)   11/09/22 55.1 kg (121 lb 6.4 oz)     Weight Change(s) Since Admission: new admit  Wt Readings from Last 1 Encounters:   10/14/24 0318 68.4 kg (150 lb 12.7 oz)   Admit Weight: 68.4 kg (150 lb 12.7 oz) (10/14/24 0318), Weight Method: Bed Scale    Estimated Needs    Weight Used For Calorie Calculations: 68.4 kg (150 lb 12.7 oz)  Energy Calorie Requirements (kcal): 5235-9479 kcal (25-30 kcal/kg)  Energy Need Method: Kcal/kg  Weight Used For Protein Calculations: 68.4 kg (150 lb 12.7 oz)  Protein Requirements: 54-68 g (0.8-1.0 g/kg)  Fluid Requirements (mL): 1710 mL/d (1.0 mL/kcal)  CHO Requirement: 226-301 g/d (45%-60% of EER)     Enteral Nutrition     Patient not receiving enteral nutrition at this time.    Parenteral Nutrition     Patient not receiving parenteral nutrition support at this time.    Evaluation of Received Nutrient Intake    Calories: meeting estimated needs  Protein: meeting estimated needs    Patient Education     Not applicable.    Nutrition Diagnosis     PES: No nutrition diagnosis at this time unknown etiology as evidenced by n/a. (new)     Nutrition Interventions     Intervention(s): general/healthful diet, commercial beverage, multivitamin/mineral supplement therapy, prescription medication, and collaboration with other providers    Goal: Verbalize understanding of diet by discharge. (new)  Goal: Maintain weight throughout hospitalization. (new)    Nutrition Goals & Monitoring     Dietitian will monitor: food and beverage intake, weight,  glucose/endocrine profile, and gastrointestinal profile  Discharge planning: continue 2000 kcal DM diet  Nutrition Risk/Follow-Up: moderate (follow-up in 3-5 days)   Please consult if re-assessment needed sooner.

## 2024-10-15 NOTE — PROGRESS NOTES
"[facility]  [facility]  Progress Note    Patient Name: Cici Ward  MRN: [id number]  Patient Class: OP- Observation   Admission Date: 10/14/2024  Length of Stay: 0 days  Attending Physician: Suhail Henry MD  Primary Care Provider: Jolynn Leon, TOI        Subjective:     Principal Problem:Type 2 diabetes mellitus with hyperglycemia, without long-term current use of insulin        HPI:  50 yo CF with PMH of DMT2 off of medications for 1 year due to "not making it a priority", severe peripheral neuropathy, tobacco use-<1ppd since a teenager (likes to smoke and does not want to quit), Pt presented to the ER with RUQ pain. Vitals with htn and tachycardia. Glucose 649, K 2.9, Cr 1.62, CO2 21, Na 128, WBC elevated 16.5, lactic acid 4.2. No ketones in urine. No BOHB ordered due to send out. CXR, ua, EKG, trop all okay. VBG with 7.37 pH. CT abd with prominent but not inflammed appendix, right hydronephrosis without stone indicating recently passed stone. Pt was given insulin, ivf, zosyn, Kcl, troadol.  Labs improving. Hospitalist consulted for admission.     Upon exam, she was lying in bed making gasping/crying sounds with her mouth in between speaking words. She states that her feet are hurting. She also states that her abdominal pain that she had when she arrived at the ER had resolved. She denies constipation. States last bm was 1.5 days ago and that sometimes she goes a week without a bm, but this is normal for her. No nausea. No CP or SOB.     Overview/Hospital Course:  10/15/24-Patient is feeling better but still not eating much.  I discussed with her the importance of taking her meds and eating appropriately.  Will monitor today and hopefully d/c in am.    Interval History:     Review of Systems   Constitutional:  Positive for activity change.   HENT: Negative.     Eyes: Negative.    Respiratory: Negative.     Cardiovascular: Negative.    Gastrointestinal: " Negative.    Endocrine: Negative.    Genitourinary: Negative.    Musculoskeletal:  Positive for myalgias.   Skin: Negative.    Neurological:  Positive for numbness.   Hematological: Negative.    Psychiatric/Behavioral: Negative.       Objective:     Vital Signs (Most Recent):  Temp: 98.7 °F (37.1 °C) (10/15/24 0700)  Pulse: 94 (10/15/24 0700)  Resp: 20 (10/15/24 0646)  BP: (!) 81/55 (10/15/24 0700)  SpO2: 95 % (10/15/24 0700) Vital Signs (24h Range):  Temp:  [98.7 °F (37.1 °C)-101.5 °F (38.6 °C)] 98.7 °F (37.1 °C)  Pulse:  [] 94  Resp:  [18-20] 20  SpO2:  [94 %-100 %] 95 %  BP: ()/(55-84) 81/55     Weight: 68.4 kg (150 lb 12.7 oz)  Body mass index is 24.34 kg/m².  No intake or output data in the 24 hours ending 10/15/24 1229      Physical Exam  Constitutional:       Appearance: Normal appearance. She is normal weight.   HENT:      Head: Normocephalic and atraumatic.      Nose: Nose normal.      Mouth/Throat:      Mouth: Mucous membranes are moist.      Pharynx: Oropharynx is clear.   Eyes:      Extraocular Movements: Extraocular movements intact.      Conjunctiva/sclera: Conjunctivae normal.      Pupils: Pupils are equal, round, and reactive to light.   Cardiovascular:      Rate and Rhythm: Normal rate and regular rhythm.      Pulses: Normal pulses.      Heart sounds: Normal heart sounds.   Pulmonary:      Effort: Pulmonary effort is normal.      Breath sounds: Normal breath sounds.   Abdominal:      General: Bowel sounds are normal.      Palpations: Abdomen is soft.   Musculoskeletal:         General: Normal range of motion.      Cervical back: Normal range of motion and neck supple.   Skin:     General: Skin is warm and dry.      Capillary Refill: Capillary refill takes 2 to 3 seconds.   Neurological:      General: No focal deficit present.      Mental Status: She is alert and oriented to person, place, and time. Mental status is at baseline.   Psychiatric:         Mood and Affect: Mood normal.          "Behavior: Behavior normal.         Thought Content: Thought content normal.         Judgment: Judgment normal.             Significant Labs: All pertinent labs within the past 24 hours have been reviewed.  BMP:   Recent Labs   Lab 10/15/24  0410   *   K 3.8      CO2 20*   BUN 10.0   CREATININE 0.92   CALCIUM 8.0*     CBC:   Recent Labs   Lab 10/14/24  0338 10/14/24  0727 10/15/24  0410   WBC 16.50* 10.83 10.38   HGB 12.0 10.0* 9.6*   HCT 35.6* 29.3* 28.7*    238 226     CMP:   Recent Labs   Lab 10/14/24  0338 10/14/24  0551 10/14/24  0727 10/14/24  1414 10/15/24  0410   *   < > 131* 134* 135*   K 2.9*   < > 3.1* 4.1 3.8   CL 89*   < > 100 102 107   CO2 21*   < > 21* 20* 20*   BUN 12.0   < > 11.0 12.0 10.0   CREATININE 1.62*   < > 1.36* 1.43* 0.92   CALCIUM 9.8   < > 8.5 9.1 8.0*   ALBUMIN 3.4*  --  2.7*  --  2.4*   BILITOT 0.5  --  0.4  --  0.4   ALKPHOS 138  --  100  --  111   AST 8  --  6  --  15   ALT 8  --  6  --  8    < > = values in this interval not displayed.     Magnesium: No results for input(s): "MG" in the last 48 hours.    Significant Imaging: I have reviewed all pertinent imaging results/findings within the past 24 hours.    Assessment/Plan:      * Type 2 diabetes mellitus with hyperglycemia, without long-term current use of insulin  Admit for IVF, electrolyte replacement.  On tele for electrolyte disturbances. Can dc once improved.  Labs q6-cmp and phos   Starting glargine 14 units qpm  ISS high  On zosyn. De-escalate as needed. Urine culture and blood culture pending. Pt with RLQ abd pain upon palpation that does not match CT findings/rest of clinical picture.  Monitoring I/Os    VTE Ppx: SCDs    KERRIE (acute kidney injury)  KERRIE is likely due to pre-renal azotemia due to intravascular volume depletion. Baseline creatinine is 0.72. Most recent creatinine and eGFR are listed below.  Recent Labs     10/14/24  0551 10/14/24  0727 10/14/24  1414   CREATININE 1.36* 1.36* 1.43* "   EGFRNORACEVR 48 48 46      Plan  - KERRIE is improving  - Avoid nephrotoxins and renally dose meds for GFR listed above  - Monitor urine output, serial BMP, and adjust therapy as needed    Hydronephrosis of right kidney  UA okay.  On abd ct, indicating recently passed renal stone.   Will monitor I/Os.   RLQ Abd pain improved at rest. Severe pain upon palpation, out of proportion to CT findings.   Pt was started on zosyn in ER. Blood cultures were collected. Can de-escalate if feel no infectious process.       Tobacco dependence  Dangers of cigarette smoking were reviewed with patient in detail. Patient was Counseled for 3-10 minutes. Nicotine replacement options were discussed. Nicotine replacement was discussed- not prescribed per patient's request  Pt states that she does not want to stop smoking. She likes to smoke. She told me this more than once.     Dehydration  IVF.      Hypophosphatemia  Patient has Abnormal Phosphorus: hypophosphatemia. Will continue to monitor electrolytes closely. Will replace the affected electrolytes and repeat labs to be done after interventions completed. The patient's phosphorus results have been reviewed and are listed below.  Recent Labs   Lab 10/14/24  0727   PHOS 1.0*        Hypokalemia  Patient's most recent potassium results are listed below.   Recent Labs     10/14/24  0551 10/14/24  0727 10/14/24  1414   K 3.2* 3.1* 4.1     Plan  - Replete potassium per protocol  - Monitor potassium Every 6 hours  - Patient's hypokalemia is improving        VTE Risk Mitigation (From admission, onward)           Ordered     IP VTE LOW RISK PATIENT  Once         10/14/24 0929     Place sequential compression device  Until discontinued         10/14/24 0929                  Stop IV fluids  Resume lantus  Patient needs to eat more  OOB  Possible d/c tomorrow    Discharge Planning   SHON:      Code Status: Full Code   Is the patient medically ready for discharge?:     Reason for patient still in  hospital (select all that apply): Patient trending condition, Laboratory test, Treatment, and Pending disposition  Discharge Plan A: Home                  Suhail Henry MD  Department of Hospital Medicine   [facility]     Hatchet Flap Text: The defect edges were debeveled with a #15 scalpel blade.  Given the location of the defect, shape of the defect and the proximity to free margins a hatchet flap was deemed most appropriate.  Using a sterile surgical marker, an appropriate hatchet flap was drawn incorporating the defect and placing the expected incisions within the relaxed skin tension lines where possible.    The area thus outlined was incised deep to adipose tissue with a #15 scalpel blade.  The skin margins were undermined to an appropriate distance in all directions utilizing iris scissors.

## 2024-10-15 NOTE — NURSING
Called lab to inquire about phosphorous level. Lab stated that it is a send out and the  will be here around 10 AM to collect specimen.

## 2024-10-15 NOTE — PLAN OF CARE
Problem: Adult Inpatient Plan of Care  Goal: Plan of Care Review  Outcome: Progressing  Goal: Patient-Specific Goal (Individualized)  Outcome: Progressing  Goal: Absence of Hospital-Acquired Illness or Injury  Outcome: Progressing  Goal: Optimal Comfort and Wellbeing  Outcome: Progressing  Goal: Readiness for Transition of Care  Outcome: Progressing     Problem: Comorbidity Management  Goal: Blood Pressure in Desired Range  Outcome: Progressing     Problem: Pain Acute  Goal: Optimal Pain Control and Function  Outcome: Progressing     Problem: Fatigue  Goal: Improved Activity Tolerance  Outcome: Progressing     Problem: Infection  Goal: Absence of Infection Signs and Symptoms  Outcome: Progressing     Problem: Fluid Volume Deficit  Goal: Fluid Balance  Outcome: Progressing     Problem: Fall Injury Risk  Goal: Absence of Fall and Fall-Related Injury  Outcome: Progressing     Problem: Electrolyte Imbalance  Goal: Electrolyte Balance  Outcome: Progressing     Problem: Diabetes Comorbidity  Goal: Blood Glucose Level Within Targeted Range  Outcome: Progressing     Problem: Depression  Goal: Improved Mood  Outcome: Progressing     Problem: Acute Kidney Injury/Impairment  Goal: Fluid and Electrolyte Balance  Outcome: Progressing  Goal: Improved Oral Intake  Outcome: Progressing  Goal: Effective Renal Function  Outcome: Progressing

## 2024-10-16 LAB
ALBUMIN SERPL-MCNC: 2.1 G/DL (ref 3.5–5)
ALBUMIN/GLOB SERPL: 0.6 RATIO (ref 1.1–2)
ALP SERPL-CCNC: 117 UNIT/L (ref 40–150)
ALT SERPL-CCNC: 7 UNIT/L (ref 0–55)
ANION GAP SERPL CALC-SCNC: 8 MEQ/L
AST SERPL-CCNC: 9 UNIT/L (ref 5–34)
BACTERIA UR CULT: ABNORMAL
BASOPHILS # BLD AUTO: 0.04 X10(3)/MCL
BASOPHILS NFR BLD AUTO: 0.4 %
BILIRUB SERPL-MCNC: 0.4 MG/DL
BUN SERPL-MCNC: 14 MG/DL (ref 7–18.7)
CALCIUM SERPL-MCNC: 7.9 MG/DL (ref 8.4–10.2)
CHLORIDE SERPL-SCNC: 105 MMOL/L (ref 98–107)
CO2 SERPL-SCNC: 19 MMOL/L (ref 22–29)
CREAT SERPL-MCNC: 1.3 MG/DL (ref 0.55–1.02)
CREAT/UREA NIT SERPL: 11
EOSINOPHIL # BLD AUTO: 0.09 X10(3)/MCL (ref 0–0.9)
EOSINOPHIL NFR BLD AUTO: 0.9 %
ERYTHROCYTE [DISTWIDTH] IN BLOOD BY AUTOMATED COUNT: 15.6 % (ref 11.5–17)
GFR SERPLBLD CREATININE-BSD FMLA CKD-EPI: 51 ML/MIN/1.73/M2
GLOBULIN SER-MCNC: 3.5 GM/DL (ref 2.4–3.5)
GLUCOSE SERPL-MCNC: 193 MG/DL (ref 74–100)
HCT VFR BLD AUTO: 27.2 % (ref 37–47)
HGB BLD-MCNC: 8.9 G/DL (ref 12–16)
IMM GRANULOCYTES # BLD AUTO: 0.08 X10(3)/MCL (ref 0–0.04)
IMM GRANULOCYTES NFR BLD AUTO: 0.8 %
LYMPHOCYTES # BLD AUTO: 1.18 X10(3)/MCL (ref 0.6–4.6)
LYMPHOCYTES NFR BLD AUTO: 11.5 %
MAGNESIUM SERPL-MCNC: 1.2 MG/DL (ref 1.6–2.6)
MCH RBC QN AUTO: 27.8 PG (ref 27–31)
MCHC RBC AUTO-ENTMCNC: 32.7 G/DL (ref 33–36)
MCV RBC AUTO: 85 FL (ref 80–94)
MONOCYTES # BLD AUTO: 0.68 X10(3)/MCL (ref 0.1–1.3)
MONOCYTES NFR BLD AUTO: 6.7 %
NEUTROPHILS # BLD AUTO: 8.15 X10(3)/MCL (ref 2.1–9.2)
NEUTROPHILS NFR BLD AUTO: 79.7 %
NRBC BLD AUTO-RTO: 0 %
PLATELET # BLD AUTO: 222 X10(3)/MCL (ref 130–400)
PMV BLD AUTO: 11.3 FL (ref 7.4–10.4)
POCT GLUCOSE: 155 MG/DL (ref 70–110)
POCT GLUCOSE: 167 MG/DL (ref 70–110)
POCT GLUCOSE: 179 MG/DL (ref 70–110)
POCT GLUCOSE: 191 MG/DL (ref 70–110)
POTASSIUM SERPL-SCNC: 3.6 MMOL/L (ref 3.5–5.1)
PROT SERPL-MCNC: 5.6 GM/DL (ref 6.4–8.3)
RBC # BLD AUTO: 3.2 X10(6)/MCL (ref 4.2–5.4)
SODIUM SERPL-SCNC: 132 MMOL/L (ref 136–145)
WBC # BLD AUTO: 10.22 X10(3)/MCL (ref 4.5–11.5)

## 2024-10-16 PROCEDURE — 96372 THER/PROPH/DIAG INJ SC/IM: CPT | Performed by: INTERNAL MEDICINE

## 2024-10-16 PROCEDURE — 94761 N-INVAS EAR/PLS OXIMETRY MLT: CPT

## 2024-10-16 PROCEDURE — 25000003 PHARM REV CODE 250: Performed by: INTERNAL MEDICINE

## 2024-10-16 PROCEDURE — 11000001 HC ACUTE MED/SURG PRIVATE ROOM

## 2024-10-16 PROCEDURE — 96367 TX/PROPH/DG ADDL SEQ IV INF: CPT

## 2024-10-16 PROCEDURE — 63600175 PHARM REV CODE 636 W HCPCS: Performed by: INTERNAL MEDICINE

## 2024-10-16 PROCEDURE — 96375 TX/PRO/DX INJ NEW DRUG ADDON: CPT

## 2024-10-16 PROCEDURE — 85025 COMPLETE CBC W/AUTO DIFF WBC: CPT | Performed by: INTERNAL MEDICINE

## 2024-10-16 PROCEDURE — 80053 COMPREHEN METABOLIC PANEL: CPT | Performed by: INTERNAL MEDICINE

## 2024-10-16 PROCEDURE — A4216 STERILE WATER/SALINE, 10 ML: HCPCS

## 2024-10-16 PROCEDURE — 25000003 PHARM REV CODE 250

## 2024-10-16 PROCEDURE — 25000003 PHARM REV CODE 250: Performed by: FAMILY MEDICINE

## 2024-10-16 PROCEDURE — 83735 ASSAY OF MAGNESIUM: CPT | Performed by: INTERNAL MEDICINE

## 2024-10-16 PROCEDURE — 36415 COLL VENOUS BLD VENIPUNCTURE: CPT | Performed by: INTERNAL MEDICINE

## 2024-10-16 RX ORDER — WATER FOR INJECTION,STERILE
VIAL (ML) INJECTION
Status: DISPENSED
Start: 2024-10-16 | End: 2024-10-17

## 2024-10-16 RX ORDER — MAGNESIUM SULFATE HEPTAHYDRATE 40 MG/ML
2 INJECTION, SOLUTION INTRAVENOUS ONCE
Status: COMPLETED | OUTPATIENT
Start: 2024-10-16 | End: 2024-10-16

## 2024-10-16 RX ORDER — CEFTRIAXONE 1 G/1
1 INJECTION, POWDER, FOR SOLUTION INTRAMUSCULAR; INTRAVENOUS
Status: DISCONTINUED | OUTPATIENT
Start: 2024-10-16 | End: 2024-10-17 | Stop reason: HOSPADM

## 2024-10-16 RX ORDER — WATER FOR INJECTION,STERILE
VIAL (ML) INJECTION
Status: COMPLETED
Start: 2024-10-16 | End: 2024-10-16

## 2024-10-16 RX ADMIN — INSULIN ASPART 2 UNITS: 100 INJECTION, SOLUTION INTRAVENOUS; SUBCUTANEOUS at 08:10

## 2024-10-16 RX ADMIN — MAGNESIUM SULFATE HEPTAHYDRATE 2 G: 40 INJECTION, SOLUTION INTRAVENOUS at 10:10

## 2024-10-16 RX ADMIN — ACETAMINOPHEN 1000 MG: 500 TABLET, FILM COATED ORAL at 03:10

## 2024-10-16 RX ADMIN — POTASSIUM CHLORIDE 10 MEQ: 750 TABLET, EXTENDED RELEASE ORAL at 08:10

## 2024-10-16 RX ADMIN — HYDROCODONE BITARTRATE AND ACETAMINOPHEN 1 TABLET: 5; 325 TABLET ORAL at 07:10

## 2024-10-16 RX ADMIN — GABAPENTIN 300 MG: 300 CAPSULE ORAL at 08:10

## 2024-10-16 RX ADMIN — HYDROCODONE BITARTRATE AND ACETAMINOPHEN 1 TABLET: 5; 325 TABLET ORAL at 04:10

## 2024-10-16 RX ADMIN — INSULIN ASPART 3 UNITS: 100 INJECTION, SOLUTION INTRAVENOUS; SUBCUTANEOUS at 06:10

## 2024-10-16 RX ADMIN — INSULIN ASPART 3 UNITS: 100 INJECTION, SOLUTION INTRAVENOUS; SUBCUTANEOUS at 05:10

## 2024-10-16 RX ADMIN — HYDROCODONE BITARTRATE AND ACETAMINOPHEN 1 TABLET: 5; 325 TABLET ORAL at 10:10

## 2024-10-16 RX ADMIN — CEFTRIAXONE SODIUM 1 G: 1 INJECTION, POWDER, FOR SOLUTION INTRAMUSCULAR; INTRAVENOUS at 01:10

## 2024-10-16 RX ADMIN — INSULIN ASPART 3 UNITS: 100 INJECTION, SOLUTION INTRAVENOUS; SUBCUTANEOUS at 11:10

## 2024-10-16 RX ADMIN — GABAPENTIN 300 MG: 300 CAPSULE ORAL at 03:10

## 2024-10-16 RX ADMIN — INSULIN GLARGINE 14 UNITS: 100 INJECTION, SOLUTION SUBCUTANEOUS at 08:10

## 2024-10-16 RX ADMIN — WATER: 1 INJECTION, SOLUTION INTRAVENOUS at 01:10

## 2024-10-16 RX ADMIN — HYDROCODONE BITARTRATE AND ACETAMINOPHEN 1 TABLET: 5; 325 TABLET ORAL at 01:10

## 2024-10-16 NOTE — PROGRESS NOTES
Inpatient Nutrition Assessment    Admit Date: 10/14/2024   Total duration of encounter: 2 days   Patient Age: 49 y.o.    Nutrition Recommendation/Prescription     Continue 2000 kcal Diabetic diet as tolerated.  Continue Boost Glucose Control TID to help meet est needs (250 kcal and 14 g pro per serving).  Medical management of blood glucose.   Replenish electrolytes as medically appropriate.  Bowel regimen per MD.  Encouraged po intake and honor pt food prefs as able.  Will continue to monitor wt, labs, and po intake.    Communication of Recommendations: reviewed with patient and reviewed with family    Nutrition Assessment     Malnutrition Assessment/Nutrition-Focused Physical Exam    Malnutrition Context: acute illness or injury (10/16/24 1440)  Malnutrition Level:  (does not meet criteria) (10/16/24 1440)  Energy Intake (Malnutrition): less than 75% for greater than or equal to 3 months (10/16/24 1440)  Weight Loss (Malnutrition):  (does not meet criteria) (10/16/24 1440)  Subcutaneous Fat (Malnutrition):  (does not meet criteria) (10/16/24 1440)           Muscle Mass (Malnutrition):  (does not meet criteria) (10/16/24 1440)                          Fluid Accumulation (Malnutrition):  (does not meet criteria) (10/16/24 1440)        A minimum of two characteristics is recommended for diagnosis of either severe or non-severe malnutrition.    Chart Review    Reason Seen: follow-up    Malnutrition Screening Tool Results   Have you recently lost weight without trying?: No  Have you been eating poorly because of a decreased appetite?: No   MST Score: 0   Diagnosis:  Type 2 diabetes mellitus with hyperglycemia, without long-term current use of insulin  KERRIE (acute kidney injury)  Hydronephrosis of right kidney  Tobacco dependence  Dehydration  Hypophosphatemia  Hypokalemia    Relevant Medical History:    Depression      Diabetes mellitus      Hypertension      Mixed hyperlipidemia      Neuropathy        Scheduled  Medications:  cefTRIAXone (Rocephin) IV (PEDS and ADULTS), 1 g, Q24H  gabapentin, 300 mg, TID  insulin glargine U-100, 14 Units, QHS  lisinopriL, 10 mg, Daily  potassium chloride, 10 mEq, BID  sterile water for injection, ,     Continuous Infusions:   PRN Medications:  acetaminophen, 1,000 mg, Q6H PRN  dextrose 10%, 12.5 g, PRN  dextrose 10%, 25 g, PRN  dextrose 50%, 12.5 g, PRN  dextrose 50%, 25 g, PRN  glucagon (human recombinant), 1 mg, PRN  glucose, 16 g, PRN  glucose, 24 g, PRN  HYDROcodone-acetaminophen, 1 tablet, Q6H PRN  influenza, 0.5 mL, vaccine x 1 dose  insulin aspart U-100, 0-15 Units, QID (AC + HS) PRN  melatonin, 6 mg, Nightly PRN  ondansetron, 4 mg, Q6H PRN  sterile water for injection, ,   traMADoL, 50 mg, Q6H PRN    Calorie Containing IV Medications: no significant kcals from medications at this time    Recent Labs   Lab 10/14/24  0338 10/14/24  0551 10/14/24  0727 10/14/24  1414 10/15/24  0410 10/16/24  0420   * 131* 131* 134* 135* 132*   K 2.9* 3.2* 3.1* 4.1 3.8 3.6   CALCIUM 9.8 8.5 8.5 9.1 8.0* 7.9*   PHOS  --   --  1.0*  --  2.2*  --    MG  --   --   --   --   --  1.20*   CL 89* 99 100 102 107 105   CO2 21* 18* 21* 20* 20* 19*   BUN 12.0 11.0 11.0 12.0 10.0 14.0   CREATININE 1.62* 1.36* 1.36* 1.43* 0.92 1.30*   EGFRNORACEVR 39 48 48 46 >60 51   GLUCOSE 649* 458* 415* 298* 193* 193*   BILITOT 0.5  --  0.4  --  0.4 0.4   ALKPHOS 138  --  100  --  111 117   ALT 8  --  6  --  8 7   AST 8  --  6  --  15 9   ALBUMIN 3.4*  --  2.7*  --  2.4* 2.1*   HGBA1C  --   --  13.3*  --   --   --    WBC 16.50*  --  10.83  --  10.38 10.22   HGB 12.0  --  10.0*  --  9.6* 8.9*   HCT 35.6*  --  29.3*  --  28.7* 27.2*     Nutrition Orders:  Diet diabetic 2000 Calories (up to 75 gm per meal)  Dietary nutrition supplements TID; Boost Glucose Control - Any flavor    Appetite/Oral Intake: fair/50-75% of meals  Factors Affecting Nutritional Intake: abdominal pain, decreased appetite, nausea, and pain  Social Needs  "Impacting Access to Food: unable to assess at this time; will attempt on follow-up  Food/Cheondoism/Cultural Preferences:  likes a  salad with no lettuce  Food Allergies: no known food allergies  Last Bowel Movement: 10/12/24  Wound(s):  no partial or full thickness pressure injuries documented    Comments    10/15/24: Pt appetite and intake fair. 75% avg po intake x last 2 meals recorded per EMR. Last BM noted 3 days ago. Pt sometimes goes a week w/o a BM. Pt has not been taking diabetes medication for the past year. Per EMR, pt has a 25# wt loss in the last 3 months (14%-significant). Will complete early f/u tmrw to perform NFPE. Will add Boost GC TID to help meet est needs. Encourage po intake and honor food prefs as able.     10/16/24: Pt reports her appetite/intake has not been good since "forever". Pt prefers to eat smaller meals frequently throughout the day and states when she tries to eat a regular sized meal she gets full after 2 bites. Per EMR, pt avg po intake 75% x last 3 meals. Pt denies issues c/s. Pt reports nausea 2/2 medication; denies vomiting today. Pt states her weight fluctuates and reports her UBW is around 140-145#. CBW: 150#. Wt stable. Pt has not tried the Boost yet, but states she is saving them and will try them later. Labs reviewed. GLU: 649, 458, 415, 298, 193, 193.     Anthropometrics    Height: 5' 6" (167.6 cm), Height Method: Stated  Last Weight: 68.4 kg (150 lb 12.7 oz) (10/14/24 0318), Weight Method: Bed Scale  BMI (Calculated): 24.4  BMI Classification: normal (BMI 18.5-24.9)     Ideal Body Weight (IBW), Female: 130 lb     % Ideal Body Weight, Female (lb): 116 %                             Usual Weight Provided By: EMR weight history    Wt Readings from Last 5 Encounters:   10/14/24 68.4 kg (150 lb 12.7 oz)   07/17/24 79.4 kg (175 lb)   06/02/23 58.7 kg (129 lb 4.8 oz)   11/09/22 55.1 kg (121 lb 6.4 oz)     Weight Change(s) Since Admission: no new weights  Wt Readings from Last " 1 Encounters:   10/14/24 0318 68.4 kg (150 lb 12.7 oz)   Admit Weight: 68.4 kg (150 lb 12.7 oz) (10/14/24 0318), Weight Method: Bed Scale    Estimated Needs    Weight Used For Calorie Calculations: 68.4 kg (150 lb 12.7 oz)  Energy Calorie Requirements (kcal): 8753-5751 kcal (25-30 kcal/kg)  Energy Need Method: Kcal/kg  Weight Used For Protein Calculations: 68.4 kg (150 lb 12.7 oz)  Protein Requirements: 54-68 g (0.8-1.0 g/kg)  Fluid Requirements (mL): 1710 mL/d (1.0 mL/kcal)  CHO Requirement: 226-301 g/d (45%-60% of EER)     Enteral Nutrition     Patient not receiving enteral nutrition at this time.    Parenteral Nutrition     Patient not receiving parenteral nutrition support at this time.    Evaluation of Received Nutrient Intake    Calories: not meeting estimated needs  Protein: not meeting estimated needs    Patient Education     Not applicable.    Nutrition Diagnosis     PES: Inadequate energy intake related to inability to consume sufficient nutrients as evidenced by decreased appetite. (new)     Nutrition Interventions     Intervention(s): general/healthful diet, commercial beverage, multivitamin/mineral supplement therapy, prescription medication, and collaboration with other providers    Goal: Verbalize understanding of diet by discharge. (goal progressing)  Goal: Maintain weight throughout hospitalization. (goal progressing)    Nutrition Goals & Monitoring     Dietitian will monitor: food and beverage intake, weight, glucose/endocrine profile, and gastrointestinal profile  Discharge planning: continue 2000 kcal DM diet  Nutrition Risk/Follow-Up: moderate (follow-up in 3-5 days)   Please consult if re-assessment needed sooner.

## 2024-10-16 NOTE — PROGRESS NOTES
"[facility]  [facility] Medicine  Progress Note    Patient Name: Cici Ward  MRN: [id number]  Patient Class: OP- Observation   Admission Date: 10/14/2024  Length of Stay: 0 days  Attending Physician: Suhail Henry MD  Primary Care Provider: Jolynn Leon, TOI        Subjective:     Principal Problem:Type 2 diabetes mellitus with hyperglycemia, without long-term current use of insulin        HPI:  48 yo CF with PMH of DMT2 off of medications for 1 year due to "not making it a priority", severe peripheral neuropathy, tobacco use-<1ppd since a teenager (likes to smoke and does not want to quit), Pt presented to the ER with RUQ pain. Vitals with htn and tachycardia. Glucose 649, K 2.9, Cr 1.62, CO2 21, Na 128, WBC elevated 16.5, lactic acid 4.2. No ketones in urine. No BOHB ordered due to send out. CXR, ua, EKG, trop all okay. VBG with 7.37 pH. CT abd with prominent but not inflammed appendix, right hydronephrosis without stone indicating recently passed stone. Pt was given insulin, ivf, zosyn, Kcl, troadol.  Labs improving. Hospitalist consulted for admission.     Upon exam, she was lying in bed making gasping/crying sounds with her mouth in between speaking words. She states that her feet are hurting. She also states that her abdominal pain that she had when she arrived at the ER had resolved. She denies constipation. States last bm was 1.5 days ago and that sometimes she goes a week without a bm, but this is normal for her. No nausea. No CP or SOB.     Overview/Hospital Course:  10/15/24-Patient is feeling better but still not eating much.  I discussed with her the importance of taking her meds and eating appropriately.  Will monitor today and hopefully d/c in am.    10/16/24-Patient is still not feeling well.  Her Mag dropped today which will require replacement and she started with a fever.  On admit she had a possible UTI which only had a colony count of 75,000.  Will go " ahead and treat this now that she has fever.      Interval History:     Review of Systems   Constitutional:  Positive for activity change, fatigue and fever.   HENT: Negative.     Eyes: Negative.    Respiratory: Negative.     Cardiovascular: Negative.    Gastrointestinal: Negative.    Endocrine: Negative.    Genitourinary: Negative.    Musculoskeletal:  Positive for myalgias.   Skin: Negative.    Allergic/Immunologic: Negative.    Neurological: Negative.    Hematological: Negative.    Psychiatric/Behavioral: Negative.       Objective:     Vital Signs (Most Recent):  Temp: 99.1 °F (37.3 °C) (10/16/24 1134)  Pulse: 91 (10/16/24 1134)  Resp: 18 (10/16/24 1134)  BP: 94/60 (10/16/24 1134)  SpO2: 98 % (10/16/24 1134) Vital Signs (24h Range):  Temp:  [98.6 °F (37 °C)-101 °F (38.3 °C)] 99.1 °F (37.3 °C)  Pulse:  [] 91  Resp:  [16-20] 18  SpO2:  [94 %-98 %] 98 %  BP: ()/(56-81) 94/60     Weight: 68.4 kg (150 lb 12.7 oz)  Body mass index is 24.34 kg/m².    Intake/Output Summary (Last 24 hours) at 10/16/2024 1237  Last data filed at 10/16/2024 1205  Gross per 24 hour   Intake 2451.47 ml   Output --   Net 2451.47 ml         Physical Exam  Constitutional:       Appearance: Normal appearance. She is normal weight.   HENT:      Head: Normocephalic and atraumatic.      Nose: Nose normal.      Mouth/Throat:      Mouth: Mucous membranes are moist.      Pharynx: Oropharynx is clear.   Eyes:      Conjunctiva/sclera: Conjunctivae normal.      Pupils: Pupils are equal, round, and reactive to light.   Cardiovascular:      Rate and Rhythm: Normal rate and regular rhythm.      Pulses: Normal pulses.      Heart sounds: Normal heart sounds.   Pulmonary:      Effort: Pulmonary effort is normal.      Breath sounds: Normal breath sounds.   Abdominal:      General: Bowel sounds are normal.      Palpations: Abdomen is soft.   Musculoskeletal:         General: Normal range of motion.      Cervical back: Normal range of motion and neck  supple.   Skin:     General: Skin is warm and dry.      Capillary Refill: Capillary refill takes 2 to 3 seconds.   Neurological:      General: No focal deficit present.      Mental Status: She is alert. Mental status is at baseline.   Psychiatric:         Mood and Affect: Mood normal.         Behavior: Behavior normal.         Thought Content: Thought content normal.         Judgment: Judgment normal.             Significant Labs: All pertinent labs within the past 24 hours have been reviewed.  BMP:   Recent Labs   Lab 10/16/24  0420   *   K 3.6      CO2 19*   BUN 14.0   CREATININE 1.30*   CALCIUM 7.9*   MG 1.20*     CBC:   Recent Labs   Lab 10/15/24  0410 10/16/24  0420   WBC 10.38 10.22   HGB 9.6* 8.9*   HCT 28.7* 27.2*    222     CMP:   Recent Labs   Lab 10/14/24  1414 10/15/24  0410 10/16/24  0420   * 135* 132*   K 4.1 3.8 3.6    107 105   CO2 20* 20* 19*   BUN 12.0 10.0 14.0   CREATININE 1.43* 0.92 1.30*   CALCIUM 9.1 8.0* 7.9*   ALBUMIN  --  2.4* 2.1*   BILITOT  --  0.4 0.4   ALKPHOS  --  111 117   AST  --  15 9   ALT  --  8 7     Magnesium:   Recent Labs   Lab 10/16/24  0420   MG 1.20*       Significant Imaging: I have reviewed all pertinent imaging results/findings within the past 24 hours.    Assessment/Plan:      * Type 2 diabetes mellitus with hyperglycemia, without long-term current use of insulin  Admit for IVF, electrolyte replacement.  On tele for electrolyte disturbances. Can dc once improved.  Labs q6-cmp and phos   Starting glargine 14 units qpm  ISS high  On zosyn. De-escalate as needed. Urine culture and blood culture pending. Pt with RLQ abd pain upon palpation that does not match CT findings/rest of clinical picture.  Monitoring I/Os    VTE Ppx: SCDs    KERRIE (acute kidney injury)  KERRIE is likely due to pre-renal azotemia due to intravascular volume depletion. Baseline creatinine is 0.72. Most recent creatinine and eGFR are listed below.  Recent Labs      10/14/24  0551 10/14/24  0727 10/14/24  1414   CREATININE 1.36* 1.36* 1.43*   EGFRNORACEVR 48 48 46      Plan  - KERRIE is improving  - Avoid nephrotoxins and renally dose meds for GFR listed above  - Monitor urine output, serial BMP, and adjust therapy as needed    Hydronephrosis of right kidney  UA okay.  On abd ct, indicating recently passed renal stone.   Will monitor I/Os.   RLQ Abd pain improved at rest. Severe pain upon palpation, out of proportion to CT findings.   Pt was started on zosyn in ER. Blood cultures were collected. Can de-escalate if feel no infectious process.       Tobacco dependence  Dangers of cigarette smoking were reviewed with patient in detail. Patient was Counseled for 3-10 minutes. Nicotine replacement options were discussed. Nicotine replacement was discussed- not prescribed per patient's request  Pt states that she does not want to stop smoking. She likes to smoke. She told me this more than once.     Dehydration  IVF.      Hypophosphatemia  Patient has Abnormal Phosphorus: hypophosphatemia. Will continue to monitor electrolytes closely. Will replace the affected electrolytes and repeat labs to be done after interventions completed. The patient's phosphorus results have been reviewed and are listed below.  Recent Labs   Lab 10/14/24  0727   PHOS 1.0*        Hypokalemia  Patient's most recent potassium results are listed below.   Recent Labs     10/14/24  0551 10/14/24  0727 10/14/24  1414   K 3.2* 3.1* 4.1     Plan  - Replete potassium per protocol  - Monitor potassium Every 6 hours  - Patient's hypokalemia is improving        VTE Risk Mitigation (From admission, onward)           Ordered     IP VTE LOW RISK PATIENT  Once         10/14/24 0929     Place sequential compression device  Until discontinued         10/14/24 0929                  Will add rocephin for UTI which was present on admission but due to lower counts it was not treated.  But now she is having some fever.  Follow labs  as needed  Replace Mag IV  OOB  D/c soon  Discharge Planning   SHON:      Code Status: Full Code   Is the patient medically ready for discharge?:     Reason for patient still in hospital (select all that apply): Patient trending condition, Laboratory test, and Treatment  Discharge Plan A: Home                  Suhail Henry MD  Department of Hospital Medicine   [facility]

## 2024-10-16 NOTE — SUBJECTIVE & OBJECTIVE
Interval History:     Review of Systems   Constitutional:  Positive for activity change, fatigue and fever.   HENT: Negative.     Eyes: Negative.    Respiratory: Negative.     Cardiovascular: Negative.    Gastrointestinal: Negative.    Endocrine: Negative.    Genitourinary: Negative.    Musculoskeletal:  Positive for myalgias.   Skin: Negative.    Allergic/Immunologic: Negative.    Neurological: Negative.    Hematological: Negative.    Psychiatric/Behavioral: Negative.       Objective:     Vital Signs (Most Recent):  Temp: 99.1 °F (37.3 °C) (10/16/24 1134)  Pulse: 91 (10/16/24 1134)  Resp: 18 (10/16/24 1134)  BP: 94/60 (10/16/24 1134)  SpO2: 98 % (10/16/24 1134) Vital Signs (24h Range):  Temp:  [98.6 °F (37 °C)-101 °F (38.3 °C)] 99.1 °F (37.3 °C)  Pulse:  [] 91  Resp:  [16-20] 18  SpO2:  [94 %-98 %] 98 %  BP: ()/(56-81) 94/60     Weight: 68.4 kg (150 lb 12.7 oz)  Body mass index is 24.34 kg/m².    Intake/Output Summary (Last 24 hours) at 10/16/2024 1237  Last data filed at 10/16/2024 1205  Gross per 24 hour   Intake 2451.47 ml   Output --   Net 2451.47 ml         Physical Exam  Constitutional:       Appearance: Normal appearance. She is normal weight.   HENT:      Head: Normocephalic and atraumatic.      Nose: Nose normal.      Mouth/Throat:      Mouth: Mucous membranes are moist.      Pharynx: Oropharynx is clear.   Eyes:      Conjunctiva/sclera: Conjunctivae normal.      Pupils: Pupils are equal, round, and reactive to light.   Cardiovascular:      Rate and Rhythm: Normal rate and regular rhythm.      Pulses: Normal pulses.      Heart sounds: Normal heart sounds.   Pulmonary:      Effort: Pulmonary effort is normal.      Breath sounds: Normal breath sounds.   Abdominal:      General: Bowel sounds are normal.      Palpations: Abdomen is soft.   Musculoskeletal:         General: Normal range of motion.      Cervical back: Normal range of motion and neck supple.   Skin:     General: Skin is warm and dry.       Capillary Refill: Capillary refill takes 2 to 3 seconds.   Neurological:      General: No focal deficit present.      Mental Status: She is alert. Mental status is at baseline.   Psychiatric:         Mood and Affect: Mood normal.         Behavior: Behavior normal.         Thought Content: Thought content normal.         Judgment: Judgment normal.             Significant Labs: All pertinent labs within the past 24 hours have been reviewed.  BMP:   Recent Labs   Lab 10/16/24  0420   *   K 3.6      CO2 19*   BUN 14.0   CREATININE 1.30*   CALCIUM 7.9*   MG 1.20*     CBC:   Recent Labs   Lab 10/15/24  0410 10/16/24  0420   WBC 10.38 10.22   HGB 9.6* 8.9*   HCT 28.7* 27.2*    222     CMP:   Recent Labs   Lab 10/14/24  1414 10/15/24  0410 10/16/24  0420   * 135* 132*   K 4.1 3.8 3.6    107 105   CO2 20* 20* 19*   BUN 12.0 10.0 14.0   CREATININE 1.43* 0.92 1.30*   CALCIUM 9.1 8.0* 7.9*   ALBUMIN  --  2.4* 2.1*   BILITOT  --  0.4 0.4   ALKPHOS  --  111 117   AST  --  15 9   ALT  --  8 7     Magnesium:   Recent Labs   Lab 10/16/24  0420   MG 1.20*       Significant Imaging: I have reviewed all pertinent imaging results/findings within the past 24 hours.

## 2024-10-17 VITALS
WEIGHT: 150.81 LBS | BODY MASS INDEX: 24.24 KG/M2 | SYSTOLIC BLOOD PRESSURE: 99 MMHG | DIASTOLIC BLOOD PRESSURE: 63 MMHG | OXYGEN SATURATION: 90 % | RESPIRATION RATE: 18 BRPM | HEART RATE: 98 BPM | HEIGHT: 66 IN | TEMPERATURE: 99 F

## 2024-10-17 LAB
ALBUMIN SERPL-MCNC: 2.1 G/DL (ref 3.5–5)
ALBUMIN/GLOB SERPL: 0.5 RATIO (ref 1.1–2)
ALP SERPL-CCNC: 138 UNIT/L (ref 40–150)
ALT SERPL-CCNC: 5 UNIT/L (ref 0–55)
ANION GAP SERPL CALC-SCNC: 8 MEQ/L
AST SERPL-CCNC: 9 UNIT/L (ref 5–34)
BASOPHILS # BLD AUTO: 0.03 X10(3)/MCL
BASOPHILS NFR BLD AUTO: 0.4 %
BILIRUB SERPL-MCNC: 0.2 MG/DL
BUN SERPL-MCNC: 12 MG/DL (ref 7–18.7)
CALCIUM SERPL-MCNC: 8.2 MG/DL (ref 8.4–10.2)
CHLORIDE SERPL-SCNC: 105 MMOL/L (ref 98–107)
CO2 SERPL-SCNC: 21 MMOL/L (ref 22–29)
CREAT SERPL-MCNC: 1.24 MG/DL (ref 0.55–1.02)
CREAT/UREA NIT SERPL: 10
EOSINOPHIL # BLD AUTO: 0.11 X10(3)/MCL (ref 0–0.9)
EOSINOPHIL NFR BLD AUTO: 1.4 %
ERYTHROCYTE [DISTWIDTH] IN BLOOD BY AUTOMATED COUNT: 15.7 % (ref 11.5–17)
GFR SERPLBLD CREATININE-BSD FMLA CKD-EPI: 54 ML/MIN/1.73/M2
GLOBULIN SER-MCNC: 3.9 GM/DL (ref 2.4–3.5)
GLUCOSE SERPL-MCNC: 108 MG/DL (ref 74–100)
HCT VFR BLD AUTO: 24.8 % (ref 37–47)
HGB BLD-MCNC: 8.2 G/DL (ref 12–16)
IMM GRANULOCYTES # BLD AUTO: 0.06 X10(3)/MCL (ref 0–0.04)
IMM GRANULOCYTES NFR BLD AUTO: 0.8 %
LYMPHOCYTES # BLD AUTO: 1.19 X10(3)/MCL (ref 0.6–4.6)
LYMPHOCYTES NFR BLD AUTO: 15.6 %
MAGNESIUM SERPL-MCNC: 1.6 MG/DL (ref 1.6–2.6)
MCH RBC QN AUTO: 27.7 PG (ref 27–31)
MCHC RBC AUTO-ENTMCNC: 33.1 G/DL (ref 33–36)
MCV RBC AUTO: 83.8 FL (ref 80–94)
MONOCYTES # BLD AUTO: 0.51 X10(3)/MCL (ref 0.1–1.3)
MONOCYTES NFR BLD AUTO: 6.7 %
NEUTROPHILS # BLD AUTO: 5.75 X10(3)/MCL (ref 2.1–9.2)
NEUTROPHILS NFR BLD AUTO: 75.1 %
NRBC BLD AUTO-RTO: 0 %
PLATELET # BLD AUTO: 249 X10(3)/MCL (ref 130–400)
PMV BLD AUTO: 11 FL (ref 7.4–10.4)
POCT GLUCOSE: 114 MG/DL (ref 70–110)
POCT GLUCOSE: 125 MG/DL (ref 70–110)
POTASSIUM SERPL-SCNC: 3.8 MMOL/L (ref 3.5–5.1)
PROT SERPL-MCNC: 6 GM/DL (ref 6.4–8.3)
RBC # BLD AUTO: 2.96 X10(6)/MCL (ref 4.2–5.4)
SODIUM SERPL-SCNC: 134 MMOL/L (ref 136–145)
WBC # BLD AUTO: 7.65 X10(3)/MCL (ref 4.5–11.5)

## 2024-10-17 PROCEDURE — 80053 COMPREHEN METABOLIC PANEL: CPT | Performed by: INTERNAL MEDICINE

## 2024-10-17 PROCEDURE — 25000003 PHARM REV CODE 250: Performed by: FAMILY MEDICINE

## 2024-10-17 PROCEDURE — 36415 COLL VENOUS BLD VENIPUNCTURE: CPT | Performed by: INTERNAL MEDICINE

## 2024-10-17 PROCEDURE — 90471 IMMUNIZATION ADMIN: CPT | Performed by: INTERNAL MEDICINE

## 2024-10-17 PROCEDURE — 25000003 PHARM REV CODE 250: Performed by: INTERNAL MEDICINE

## 2024-10-17 PROCEDURE — 63600175 PHARM REV CODE 636 W HCPCS: Performed by: INTERNAL MEDICINE

## 2024-10-17 PROCEDURE — 94761 N-INVAS EAR/PLS OXIMETRY MLT: CPT

## 2024-10-17 PROCEDURE — 83735 ASSAY OF MAGNESIUM: CPT | Performed by: INTERNAL MEDICINE

## 2024-10-17 PROCEDURE — 90656 IIV3 VACC NO PRSV 0.5 ML IM: CPT | Performed by: INTERNAL MEDICINE

## 2024-10-17 PROCEDURE — 85025 COMPLETE CBC W/AUTO DIFF WBC: CPT | Performed by: INTERNAL MEDICINE

## 2024-10-17 PROCEDURE — 3E02340 INTRODUCTION OF INFLUENZA VACCINE INTO MUSCLE, PERCUTANEOUS APPROACH: ICD-10-PCS | Performed by: INTERNAL MEDICINE

## 2024-10-17 RX ORDER — INSULIN ASPART 100 [IU]/ML
0-15 INJECTION, SOLUTION INTRAVENOUS; SUBCUTANEOUS
Qty: 10 ML | Refills: 4 | Status: SHIPPED | OUTPATIENT
Start: 2024-10-17 | End: 2025-10-17

## 2024-10-17 RX ORDER — HYDROCODONE BITARTRATE AND ACETAMINOPHEN 5; 325 MG/1; MG/1
1 TABLET ORAL EVERY 6 HOURS PRN
Qty: 20 TABLET | Refills: 0 | Status: SHIPPED | OUTPATIENT
Start: 2024-10-17

## 2024-10-17 RX ORDER — GABAPENTIN 300 MG/1
600 CAPSULE ORAL 3 TIMES DAILY
Qty: 180 CAPSULE | Refills: 4 | Status: SHIPPED | OUTPATIENT
Start: 2024-10-17 | End: 2024-10-22

## 2024-10-17 RX ORDER — LANCETS
EACH MISCELLANEOUS
Qty: 30 EACH | Refills: 4 | Status: SHIPPED | OUTPATIENT
Start: 2024-10-17

## 2024-10-17 RX ORDER — DOXYCYCLINE HYCLATE 100 MG
100 TABLET ORAL EVERY 12 HOURS
Qty: 14 TABLET | Refills: 0 | Status: SHIPPED | OUTPATIENT
Start: 2024-10-17 | End: 2024-10-24

## 2024-10-17 RX ORDER — INSULIN GLARGINE 100 [IU]/ML
14 INJECTION, SOLUTION SUBCUTANEOUS NIGHTLY
Qty: 4.2 ML | Refills: 11 | Status: SHIPPED | OUTPATIENT
Start: 2024-10-17 | End: 2024-10-22

## 2024-10-17 RX ORDER — LISINOPRIL 10 MG/1
10 TABLET ORAL DAILY
Qty: 30 TABLET | Refills: 4 | Status: SHIPPED | OUTPATIENT
Start: 2024-10-17 | End: 2025-03-16

## 2024-10-17 RX ADMIN — HYDROCODONE BITARTRATE AND ACETAMINOPHEN 1 TABLET: 5; 325 TABLET ORAL at 03:10

## 2024-10-17 RX ADMIN — POTASSIUM CHLORIDE 10 MEQ: 750 TABLET, EXTENDED RELEASE ORAL at 09:10

## 2024-10-17 RX ADMIN — TRAMADOL HYDROCHLORIDE 50 MG: 50 TABLET, COATED ORAL at 10:10

## 2024-10-17 RX ADMIN — GABAPENTIN 300 MG: 300 CAPSULE ORAL at 09:10

## 2024-10-17 RX ADMIN — INFLUENZA VIRUS VACCINE 0.5 ML: 15; 15; 15 SUSPENSION INTRAMUSCULAR at 11:10

## 2024-10-17 NOTE — PLAN OF CARE
10/17/24 0945   Discharge Reassessment   Assessment Type Discharge Planning Reassessment   Did the patient's condition or plan change since previous assessment? No   Discharge Plan discussed with: Patient   Communicated SHON with patient/caregiver Date not available/Unable to determine   Discharge Plan A Home   DME Needed Upon Discharge  none   Transition of Care Barriers Does not adhere to care plan   Why the patient remains in the hospital Requires continued medical care   Post-Acute Status   Hospital Resources/Appts/Education Provided Provided education on problems/symptoms using teachback   Discharge Delays None known at this time

## 2024-10-17 NOTE — NURSING
Discussed pt DC instructions and medications with pt and neighbor. Stated understanding. Reinforced insulin administration. Stated understanding. No further questions.

## 2024-10-17 NOTE — PLAN OF CARE
10/17/24 1105   Final Note   Assessment Type Final Discharge Note   Anticipated Discharge Disposition Home   What phone number can be called within the next 1-3 days to see how you are doing after discharge? [phone]   Hospital Resources/Appts/Education Provided Appointment suggestion unavailable   Post-Acute Status   Discharge Delays None known at this time     PCP provider offered and appt being made.

## 2024-10-17 NOTE — PLAN OF CARE
Problem: Adult Inpatient Plan of Care  Goal: Plan of Care Review  Outcome: Met  Goal: Patient-Specific Goal (Individualized)  Outcome: Met  Goal: Absence of Hospital-Acquired Illness or Injury  Outcome: Met  Goal: Optimal Comfort and Wellbeing  Outcome: Met  Goal: Readiness for Transition of Care  Outcome: Met     Problem: Comorbidity Management  Goal: Blood Pressure in Desired Range  Outcome: Met     Problem: Pain Acute  Goal: Optimal Pain Control and Function  Outcome: Met     Problem: Fatigue  Goal: Improved Activity Tolerance  Outcome: Met     Problem: Infection  Goal: Absence of Infection Signs and Symptoms  Outcome: Met     Problem: Fluid Volume Deficit  Goal: Fluid Balance  Outcome: Met     Problem: Fall Injury Risk  Goal: Absence of Fall and Fall-Related Injury  Outcome: Met     Problem: Electrolyte Imbalance  Goal: Electrolyte Balance  Outcome: Met     Problem: Diabetes Comorbidity  Goal: Blood Glucose Level Within Targeted Range  Outcome: Met     Problem: Depression  Goal: Improved Mood  Outcome: Met     Problem: Acute Kidney Injury/Impairment  Goal: Fluid and Electrolyte Balance  Outcome: Met  Goal: Improved Oral Intake  Outcome: Met  Goal: Effective Renal Function  Outcome: Met

## 2024-10-17 NOTE — DISCHARGE INSTRUCTIONS
Pt to DC home in stable condition.    Take all medications as prescribed.    Keep all follow up appts as scheduled.    Follow regular insulin sliding scale during the day.     Take Lantus 14 units nightly. Hold if sugar is <100.    Check sugars when you wake up, at lunch, at supper, at bedtime.

## 2024-10-17 NOTE — DISCHARGE SUMMARY
"[facility]  Hospital Medicine  Discharge Summary      Patient Name: Cici Ward  MRN: [id number]  NATI: [id number]  Patient Class: IP- Inpatient  Admission Date: 10/14/2024  Hospital Length of Stay: 1 days  Discharge Date and Time:  10/17/2024 11:10 AM  Attending Physician: Suhail Henry MD   Discharging Provider: Suhail Henry MD  Primary Care Provider: Jolynn Leon NP    Primary Care Team: Networked reference to record PCT     HPI:   50 yo CF with PMH of DMT2 off of medications for 1 year due to "not making it a priority", severe peripheral neuropathy, tobacco use-<1ppd since a teenager (likes to smoke and does not want to quit), Pt presented to the ER with RUQ pain. Vitals with htn and tachycardia. Glucose 649, K 2.9, Cr 1.62, CO2 21, Na 128, WBC elevated 16.5, lactic acid 4.2. No ketones in urine. No BOHB ordered due to send out. CXR, ua, EKG, trop all okay. VBG with 7.37 pH. CT abd with prominent but not inflammed appendix, right hydronephrosis without stone indicating recently passed stone. Pt was given insulin, ivf, zosyn, Kcl, troadol.  Labs improving. Hospitalist consulted for admission.     Upon exam, she was lying in bed making gasping/crying sounds with her mouth in between speaking words. She states that her feet are hurting. She also states that her abdominal pain that she had when she arrived at the ER had resolved. She denies constipation. States last bm was 1.5 days ago and that sometimes she goes a week without a bm, but this is normal for her. No nausea. No CP or SOB.     * No surgery found *      Hospital Course:   10/15/24-Patient is feeling better but still not eating much.  I discussed with her the importance of taking her meds and eating appropriately.  Will monitor today and hopefully d/c in am.    10/16/24-Patient is still not feeling well.  Her Mag dropped today which will require replacement and she started with a fever.  On admit she " had a possible UTI which only had a colony count of 75,000.  Will go ahead and treat this now that she has fever.      10/17/24-Patient is feeling better overall.  She is c/o pain to her legs but she has severe neuropathy from her uncontrolled DM and non-compliance with meds.  She was started back on gabapentin and taken off her metformin and started on Lantus.  She is stable for discharge home.  Compliance has been reinforced.  She is stable for discharge home.  Will also set her up with a PCP.  Exam(A&O, NAD, RRR, CTA, BS +, NTTP, ROM I)     Goals of Care Treatment Preferences:  Code Status: Full Code      SDOH Screening:  The patient was screened for utility difficulties, food insecurity, transport difficulties, housing insecurity, and interpersonal safety and there were no concerns identified this admission.     Consults:   Consults (From admission, onward)          Status Ordering Provider     Inpatient consult to Hospitalist  Once        Provider:  Neha Isbell DO Acknowledged BURLET, ROBERT            No new Assessment & Plan notes have been filed under this hospital service since the last note was generated.  Service: Hospital Medicine    Final Active Diagnoses:    Diagnosis Date Noted POA    PRINCIPAL PROBLEM:  Type 2 diabetes mellitus with hyperglycemia, without long-term current use of insulin [E11.65] 10/14/2024 Yes    Hypokalemia [E87.6] 10/14/2024 Yes    Hypophosphatemia [E83.39] 10/14/2024 Yes    Dehydration [E86.0] 10/14/2024 Yes    Tobacco dependence [F17.200] 10/14/2024 Yes    Hydronephrosis of right kidney [N13.30] 10/14/2024 Yes    KERRIE (acute kidney injury) [N17.9] 10/14/2024 Yes      Problems Resolved During this Admission:       Discharged Condition: stable    Disposition: Home or Self Care    Follow Up:   Follow-up Information       Roderick Fuchs DO Follow up on 10/22/2024.    Specialty: Family Medicine  Why: @10:40am follow up appt  Contact information:  [address]  [place]  [address]  [phone]                           Patient Instructions:   No discharge procedures on file.    Significant Diagnostic Studies: Labs: BMP:   Recent Labs   Lab 10/16/24  0420 10/17/24  0430   * 134*   K 3.6 3.8    105   CO2 19* 21*   BUN 14.0 12.0   CREATININE 1.30* 1.24*   CALCIUM 7.9* 8.2*   MG 1.20* 1.60   , CMP   Recent Labs   Lab 10/16/24  0420 10/17/24  0430   * 134*   K 3.6 3.8    105   CO2 19* 21*   BUN 14.0 12.0   CREATININE 1.30* 1.24*   CALCIUM 7.9* 8.2*   ALBUMIN 2.1* 2.1*   BILITOT 0.4 0.2   ALKPHOS 117 138   AST 9 9   ALT 7 5   , and CBC   Recent Labs   Lab 10/16/24  0420 10/17/24  0430   WBC 10.22 7.65   HGB 8.9* 8.2*   HCT 27.2* 24.8*    249       Pending Diagnostic Studies:       None           Medications:  Reconciled Home Medications:      Medication List        START taking these medications      doxycycline 100 MG tablet  Commonly known as: VIBRA-TABS  Take 1 tablet (100 mg total) by mouth every 12 (twelve) hours. for 7 days     insulin aspart U-100 100 unit/mL injection  Commonly known as: NovoLOG  Inject 0-15 Units into the skin before meals and at bedtime as needed for High Blood Sugar.     insulin glargine U-100 (Lantus) 100 unit/mL injection  Inject 14 Units into the skin every evening.     insulin syringes (disposable) 1 mL Syrg  14 Units by Misc.(Non-Drug; Combo Route) route every evening.     lancets Misc  Commonly known as: LANCETS,THIN  Check CBGs qac/hs            CHANGE how you take these medications      gabapentin 300 MG capsule  Commonly known as: NEURONTIN  Take 2 capsules (600 mg total) by mouth 3 (three) times daily.  What changed: how much to take            CONTINUE taking these medications      lisinopriL 10 MG tablet  Take 1 tablet (10 mg total) by mouth once daily.            STOP taking these medications      ibuprofen 800 MG tablet  Commonly known as: ADVIL,MOTRIN     metFORMIN 1000 MG tablet  Commonly known as: GLUCOPHAGE      oxyCODONE-acetaminophen 5-325 mg per tablet  Commonly known as: PERCOCET     VIMPAT 100 mg Tab  Generic drug: lacosamide              Indwelling Lines/Drains at time of discharge:   Lines/Drains/Airways       None                   Time spent on the discharge of patient: 35 minutes     Patient screened for food insecurity, housing instability, transportation needs, utility difficulties, and interpersonal safety.   New Rx's ordered, Patient has been set up with a PCP as well.       Suhail Henry MD  Department of Hospital Medicine  [facility]

## 2024-10-20 ENCOUNTER — HOSPITAL ENCOUNTER (EMERGENCY)
Facility: HOSPITAL | Age: 49
Discharge: HOME | End: 2024-10-20
Attending: GENERAL PRACTICE
Payer: COMMERCIAL

## 2024-10-20 VITALS
HEIGHT: 66 IN | WEIGHT: 154.31 LBS | BODY MASS INDEX: 24.8 KG/M2 | OXYGEN SATURATION: 99 % | SYSTOLIC BLOOD PRESSURE: 154 MMHG | TEMPERATURE: 98 F | DIASTOLIC BLOOD PRESSURE: 77 MMHG | RESPIRATION RATE: 19 BRPM | HEART RATE: 91 BPM

## 2024-10-20 DIAGNOSIS — N34.2 INFECTIVE URETHRITIS: Primary | ICD-10-CM

## 2024-10-20 DIAGNOSIS — R07.89 CHEST TIGHTNESS: ICD-10-CM

## 2024-10-20 LAB
ALBUMIN SERPL-MCNC: 2.6 G/DL (ref 3.5–5)
ALBUMIN/GLOB SERPL: 0.6 RATIO (ref 1.1–2)
ALP SERPL-CCNC: 152 UNIT/L (ref 40–150)
ALT SERPL-CCNC: 9 UNIT/L (ref 0–55)
AMYLASE SERPL-CCNC: 41 UNIT/L (ref 25–125)
ANION GAP SERPL CALC-SCNC: 13 MEQ/L
AST SERPL-CCNC: 13 UNIT/L (ref 5–34)
B-HCG UR QL: NEGATIVE
BACTERIA #/AREA URNS AUTO: ABNORMAL /HPF
BASOPHILS # BLD AUTO: 0.03 X10(3)/MCL
BASOPHILS NFR BLD AUTO: 0.5 %
BILIRUB SERPL-MCNC: 0.2 MG/DL
BILIRUB UR QL STRIP.AUTO: NEGATIVE
BNP BLD-MCNC: 155 PG/ML
BUN SERPL-MCNC: 10 MG/DL (ref 7–18.7)
CALCIUM SERPL-MCNC: 9.4 MG/DL (ref 8.4–10.2)
CHLORIDE SERPL-SCNC: 100 MMOL/L (ref 98–107)
CK MB SERPL-MCNC: 1.5 NG/ML
CK SERPL-CCNC: 32 U/L (ref 29–168)
CLARITY UR: CLEAR
CO2 SERPL-SCNC: 24 MMOL/L (ref 22–29)
COLOR UR AUTO: YELLOW
CREAT SERPL-MCNC: 1.5 MG/DL (ref 0.55–1.02)
CREAT/UREA NIT SERPL: 7
EOSINOPHIL # BLD AUTO: 0.09 X10(3)/MCL (ref 0–0.9)
EOSINOPHIL NFR BLD AUTO: 1.4 %
ERYTHROCYTE [DISTWIDTH] IN BLOOD BY AUTOMATED COUNT: 15.6 % (ref 11.5–17)
GFR SERPLBLD CREATININE-BSD FMLA CKD-EPI: 43 ML/MIN/1.73/M2
GLOBULIN SER-MCNC: 4.7 GM/DL (ref 2.4–3.5)
GLUCOSE SERPL-MCNC: 276 MG/DL (ref 74–100)
GLUCOSE UR QL STRIP: ABNORMAL
HCT VFR BLD AUTO: 29.7 % (ref 37–47)
HGB BLD-MCNC: 9.5 G/DL (ref 12–16)
HGB UR QL STRIP: ABNORMAL
IMM GRANULOCYTES # BLD AUTO: 0.05 X10(3)/MCL (ref 0–0.04)
IMM GRANULOCYTES NFR BLD AUTO: 0.8 %
KETONES UR QL STRIP: NEGATIVE
LEUKOCYTE ESTERASE UR QL STRIP: ABNORMAL
LIPASE SERPL-CCNC: 18 U/L
LYMPHOCYTES # BLD AUTO: 2.39 X10(3)/MCL (ref 0.6–4.6)
LYMPHOCYTES NFR BLD AUTO: 36.9 %
MCH RBC QN AUTO: 27.1 PG (ref 27–31)
MCHC RBC AUTO-ENTMCNC: 32 G/DL (ref 33–36)
MCV RBC AUTO: 84.9 FL (ref 80–94)
MONOCYTES # BLD AUTO: 0.64 X10(3)/MCL (ref 0.1–1.3)
MONOCYTES NFR BLD AUTO: 9.9 %
NEUTROPHILS # BLD AUTO: 3.28 X10(3)/MCL (ref 2.1–9.2)
NEUTROPHILS NFR BLD AUTO: 50.5 %
NITRITE UR QL STRIP: NEGATIVE
NRBC BLD AUTO-RTO: 0 %
PH UR STRIP: 6.5 [PH]
PLATELET # BLD AUTO: 437 X10(3)/MCL (ref 130–400)
PMV BLD AUTO: 9.5 FL (ref 7.4–10.4)
POTASSIUM SERPL-SCNC: 3.8 MMOL/L (ref 3.5–5.1)
PROT SERPL-MCNC: 7.3 GM/DL (ref 6.4–8.3)
PROT UR QL STRIP: NEGATIVE
RBC # BLD AUTO: 3.5 X10(6)/MCL (ref 4.2–5.4)
RBC #/AREA URNS AUTO: ABNORMAL /HPF
SODIUM SERPL-SCNC: 137 MMOL/L (ref 136–145)
SP GR UR STRIP.AUTO: 1.01 (ref 1–1.03)
SQUAMOUS #/AREA URNS AUTO: ABNORMAL /HPF
TROPONIN I SERPL-MCNC: <0.01 NG/ML (ref 0–0.04)
TSH SERPL-ACNC: 2.49 UIU/ML (ref 0.35–4.94)
UROBILINOGEN UR STRIP-ACNC: 0.2
WBC # BLD AUTO: 6.48 X10(3)/MCL (ref 4.5–11.5)
WBC #/AREA URNS AUTO: ABNORMAL /HPF

## 2024-10-20 PROCEDURE — 84443 ASSAY THYROID STIM HORMONE: CPT | Performed by: GENERAL PRACTICE

## 2024-10-20 PROCEDURE — 25000003 PHARM REV CODE 250: Performed by: GENERAL PRACTICE

## 2024-10-20 PROCEDURE — 84484 ASSAY OF TROPONIN QUANT: CPT | Performed by: GENERAL PRACTICE

## 2024-10-20 PROCEDURE — 81001 URINALYSIS AUTO W/SCOPE: CPT | Performed by: GENERAL PRACTICE

## 2024-10-20 PROCEDURE — 83690 ASSAY OF LIPASE: CPT | Performed by: GENERAL PRACTICE

## 2024-10-20 PROCEDURE — 99285 EMERGENCY DEPT VISIT HI MDM: CPT | Mod: 25

## 2024-10-20 PROCEDURE — 87086 URINE CULTURE/COLONY COUNT: CPT | Performed by: GENERAL PRACTICE

## 2024-10-20 PROCEDURE — 85025 COMPLETE CBC W/AUTO DIFF WBC: CPT | Performed by: GENERAL PRACTICE

## 2024-10-20 PROCEDURE — 82553 CREATINE MB FRACTION: CPT | Performed by: GENERAL PRACTICE

## 2024-10-20 PROCEDURE — 81025 URINE PREGNANCY TEST: CPT | Performed by: GENERAL PRACTICE

## 2024-10-20 PROCEDURE — 80053 COMPREHEN METABOLIC PANEL: CPT | Performed by: GENERAL PRACTICE

## 2024-10-20 PROCEDURE — 81003 URINALYSIS AUTO W/O SCOPE: CPT | Performed by: GENERAL PRACTICE

## 2024-10-20 PROCEDURE — 82550 ASSAY OF CK (CPK): CPT | Performed by: GENERAL PRACTICE

## 2024-10-20 PROCEDURE — 93005 ELECTROCARDIOGRAM TRACING: CPT

## 2024-10-20 PROCEDURE — 82150 ASSAY OF AMYLASE: CPT | Performed by: GENERAL PRACTICE

## 2024-10-20 PROCEDURE — 83880 ASSAY OF NATRIURETIC PEPTIDE: CPT | Performed by: GENERAL PRACTICE

## 2024-10-20 RX ORDER — LIDOCAINE HYDROCHLORIDE 20 MG/ML
15 SOLUTION OROPHARYNGEAL ONCE
Status: COMPLETED | OUTPATIENT
Start: 2024-10-20 | End: 2024-10-20

## 2024-10-20 RX ORDER — SYRING-NEEDL,DISP,INSUL,0.3 ML 31GX15/64"
SYRINGE, EMPTY DISPOSABLE MISCELLANEOUS
COMMUNITY
Start: 2024-10-17

## 2024-10-20 RX ORDER — BLOOD-GLUCOSE CONTROL, NORMAL
EACH MISCELLANEOUS
COMMUNITY
Start: 2024-10-17

## 2024-10-20 RX ORDER — LANCETS 33 GAUGE
EACH MISCELLANEOUS 3 TIMES DAILY
COMMUNITY
Start: 2024-10-18

## 2024-10-20 RX ORDER — INSULIN DETEMIR 100 [IU]/ML
INJECTION, SOLUTION SUBCUTANEOUS
COMMUNITY
Start: 2024-10-17 | End: 2024-10-22

## 2024-10-20 RX ORDER — INSULIN ASPART 100 [IU]/ML
INJECTION, SOLUTION INTRAVENOUS; SUBCUTANEOUS
COMMUNITY
Start: 2024-10-17 | End: 2024-10-22

## 2024-10-20 RX ORDER — ALUMINUM HYDROXIDE, MAGNESIUM HYDROXIDE, AND SIMETHICONE 1200; 120; 1200 MG/30ML; MG/30ML; MG/30ML
30 SUSPENSION ORAL ONCE
Status: COMPLETED | OUTPATIENT
Start: 2024-10-20 | End: 2024-10-20

## 2024-10-20 RX ORDER — NAPROXEN SODIUM 220 MG/1
162 TABLET, FILM COATED ORAL
Status: COMPLETED | OUTPATIENT
Start: 2024-10-20 | End: 2024-10-20

## 2024-10-20 RX ADMIN — LIDOCAINE HYDROCHLORIDE 15 ML: 20 SOLUTION ORAL at 09:10

## 2024-10-20 RX ADMIN — ASPIRIN 81 MG 162 MG: 81 TABLET ORAL at 09:10

## 2024-10-20 RX ADMIN — ALUMINUM HYDROXIDE, MAGNESIUM HYDROXIDE, AND SIMETHICONE 30 ML: 1200; 120; 1200 SUSPENSION ORAL at 09:10

## 2024-10-21 LAB
OHS QRS DURATION: 82 MS
OHS QTC CALCULATION: 480 MS

## 2024-10-21 NOTE — ED PROVIDER NOTES
"Encounter Date: 10/20/2024       History     Chief Complaint   Patient presents with    Chest Pain     C/o CP and left sided rib pain starting around  tonight. Pt states the pain in the chest pain is a constant "tight" 5/10 pain. The left sided rib pain is a constant sharp pain. Recently discharged from this facility on Thursday for a kidney infection. Denies SOB except for when the pain "cuts" her breath. Denies vomiting and diarrhea. AAOx4.      C/o CP and left sided rib pain starting around  tonight. Pt states the pain in the chest pain is a constant "tight" 5/10 pain. The left sided rib pain is a constant sharp pain. Recently discharged from this facility on Thursday for a kidney infection. Denies SOB except for when the pain "cuts" her breath. Denies vomiting and diarrhea. AAOx4.    The history is provided by the patient.   Chest Pain  The current episode started just prior to arrival. Chest pain occurs constantly. The chest pain is unchanged. The pain is associated with breathing and coughing. At its most intense, the chest pain is at 5/10. The chest pain is currently at 5/10. The quality of the pain is described as aching. The pain radiates to the left shoulder. Primary symptoms include abdominal pain. She tried nothing for the symptoms.   Her past medical history is significant for hypertension.     Review of patient's allergies indicates:  No Known Allergies  Past Medical History:   Diagnosis Date    Depression     Diabetes mellitus     Hypertension     Mixed hyperlipidemia     Neuropathy      Past Surgical History:   Procedure Laterality Date     SECTION      X2    FOOT SURGERY Left     HYSTERECTOMY      TONSILLECTOMY      WRIST SURGERY Right      No family history on file.  Social History     Tobacco Use    Smoking status: Every Day     Current packs/day: 1.00     Types: Cigarettes    Smokeless tobacco: Never   Substance Use Topics    Alcohol use: Not Currently    Drug use: Never "     Review of Systems   Constitutional: Negative.    HENT: Negative.     Eyes: Negative.    Respiratory: Negative.     Cardiovascular:  Positive for chest pain.   Gastrointestinal:  Positive for abdominal pain.   Endocrine: Negative.    Genitourinary: Negative.    Musculoskeletal: Negative.    Skin: Negative.    Allergic/Immunologic: Negative.    Neurological: Negative.    Hematological: Negative.    Psychiatric/Behavioral: Negative.     All other systems reviewed and are negative.      Physical Exam     Initial Vitals [10/20/24 2115]   BP Pulse Resp Temp SpO2   (!) 189/110 92 20 98.5 °F (36.9 °C) 100 %      MAP       --         Physical Exam    Nursing note and vitals reviewed.  Constitutional: She appears well-developed and well-nourished.   HENT:   Head: Normocephalic and atraumatic.   Eyes: EOM are normal. Pupils are equal, round, and reactive to light.   Neck: Neck supple.   Normal range of motion.  Cardiovascular:  Normal rate, regular rhythm, normal heart sounds and intact distal pulses.           Pulmonary/Chest: Breath sounds normal.   Abdominal: Abdomen is soft and flat. Bowel sounds are normal. There is abdominal tenderness in the epigastric area and left upper quadrant.   Musculoskeletal:         General: Normal range of motion.      Cervical back: Normal range of motion and neck supple.     Neurological: She is alert and oriented to person, place, and time. She has normal strength. GCS score is 15. GCS eye subscore is 4. GCS verbal subscore is 5. GCS motor subscore is 6.   Skin: Skin is warm and dry.   Psychiatric: She has a normal mood and affect. Her behavior is normal. Judgment and thought content normal.         ED Course   Procedures  Labs Reviewed   COMPREHENSIVE METABOLIC PANEL - Abnormal       Result Value    Sodium 137      Potassium 3.8      Chloride 100      CO2 24      Glucose 276 (*)     Blood Urea Nitrogen 10.0      Creatinine 1.50 (*)     Calcium 9.4      Protein Total 7.3      Albumin  2.6 (*)     Globulin 4.7 (*)     Albumin/Globulin Ratio 0.6 (*)     Bilirubin Total 0.2       (*)     ALT 9      AST 13      eGFR 43      Anion Gap 13.0      BUN/Creatinine Ratio 7     B-TYPE NATRIURETIC PEPTIDE - Abnormal    Natriuretic Peptide 155.0 (*)    URINALYSIS, REFLEX TO URINE CULTURE - Abnormal    Color, UA Yellow      Appearance, UA Clear      Specific Gravity, UA 1.015      pH, UA 6.5      Protein, UA Negative      Glucose, UA 3+ (*)     Ketones, UA Negative      Blood, UA 3+ (*)     Bilirubin, UA Negative      Urobilinogen, UA 0.2      Nitrites, UA Negative      Leukocyte Esterase, UA 2+ (*)    CBC WITH DIFFERENTIAL - Abnormal    WBC 6.48      RBC 3.50 (*)     Hgb 9.5 (*)     Hct 29.7 (*)     MCV 84.9      MCH 27.1      MCHC 32.0 (*)     RDW 15.6      Platelet 437 (*)     MPV 9.5      Neut % 50.5      Lymph % 36.9      Mono % 9.9      Eos % 1.4      Basophil % 0.5      Lymph # 2.39      Neut # 3.28      Mono # 0.64      Eos # 0.09      Baso # 0.03      IG# 0.05 (*)     IG% 0.8      NRBC% 0.0     URINALYSIS, MICROSCOPIC - Abnormal    Bacteria, UA Few (*)     RBC, UA 6-10 (*)     WBC, UA 11-20 (*)     Squamous Epithelial Cells, UA Many (*)    AMYLASE - Normal    Amylase Level 41     LIPASE - Normal    Lipase Level 18     TROPONIN I - Normal    Troponin-I <0.010     TSH - Normal    TSH 2.494     CK - Normal    Creatine Kinase 32     CK-MB - Normal    Creatine Kinase MB 1.5     PREGNANCY TEST, URINE RAPID - Normal    hCG Qualitative, Urine Negative     CULTURE, URINE   CBC W/ AUTO DIFFERENTIAL    Narrative:     The following orders were created for panel order CBC auto differential.  Procedure                               Abnormality         Status                     ---------                               -----------         ------                     CBC with Differential[[id number]]       Abnormal            Final result                 Please view results for these tests on the individual  orders.     EKG Readings: (Independently Interpreted)   Rhythm: Normal Sinus Rhythm. Heart Rate: 86. Ectopy: No Ectopy. Conduction: Normal. ST Segments: Normal ST Segments. Other Findings: Prolonged QT Interval.       Imaging Results              CT Chest Abdomen Pelvis Without Contrast (XPD) (Preliminary result)  Result time 10/20/24 22:52:56      Preliminary result by Herman Murillo MD (10/20/24 22:52:56)                   Narrative:    START OF REPORT:  Technique: CT Scan of the chest abdomen and pelvis was performed without intravenous contrast with axial as well as sagittal and, coronal images.    Dosage Information: Automated Exposure Control was utilized 454.99 mGy.cm.    Comparison: Comparison is with study dated 2024-10-14 04:04:47.    Clinical History: Suspect Pancreatitis but also need to R/O cause of chest pain and left rib pain. Recently discharged from hospital for pyelo.    Findings:  Neck: The visualized soft tissues of the neck appear unremarkable.  Mediastinum: The mediastinal structures are within normal limits.  Heart: The heart size is within normal limits. Mild coronary artery calcification is seen.  Aorta: Unremarkable appearing aorta.  Pulmonary Arteries: Unremarkable.  Lungs: A few solid nodules are seen in both lungs. The largest measures 3 mm and is located in the right upper lobe (image 35 series 5). These likely represent non-calcified granulomas. No acute focal infiltrate or consolidation is seen.  Pleura: No effusions or pneumothorax are identified.  Bony Structures:  Ribs: The bilateral ribs appear unremarkable.  Abdomen:  Abdominal Wall: No abdominal wall pathology is seen.  Liver: The liver appears unremarkable.  Biliary System: No intrahepatic or extrahepatic biliary duct dilatation is seen.  Gallbladder: The gallbladder is non-distended and appears otherwise unremarkable.  Pancreas: The pancreas appears unremarkable.  Adrenals: The right adrenal gland appears unremarkable. A  stable appearing hypodense nodule is seen in the left adrenal gland. It measures 1.5 cm on image 34 series 2.  Kidneys: The left kidney appears unremarkable with no stones cysts masses or hydronephrosis. There is resolution of the mild hydronephrosis in the right. Mild decrease of the right-sided perinephric fat stranding is also seen.  Aorta: The abdominal aorta appears unremarkable.  Bowel:  Esophagus: The visualized esophagus appears unremarkable.  Stomach: The stomach appears unremarkable.  Duodenum: Unremarkable appearing duodenum.  Small Bowel: The small bowel appears unremarkable.  Colon: Nondistended.  Appendix: The appendix appears unremarkable and is seen on Image 178, Series 3 through Image 187, Series 3.  Peritoneum: No intraperitoneal free air or ascites is seen.    Pelvis:  Bladder: The bladder is nondistended but appears otherwise unremarkable.  Female:  Uterus: The uterus appears unremarkable.  Ovaries: The ovaries appear unremarkable with probable bilateral physiologic cysts.    Bony structures:  Dorsal Spine: There is mild multilevel spondylosis of the visualized dorsal spine.  Bony Pelvis: The visualized bony structures of the pelvis appear unremarkable.      Impression:  1. No acute focal infiltrate or consolidation is seen.  2. No acute intrathoracic pathology identified. No acute intraabdominal or pelvic solid organ or bowel pathology identified. Details and other findings as discussed above.                                         Medications   aspirin chewable tablet 162 mg (162 mg Oral Given 10/20/24 2159)   aluminum-magnesium hydroxide-simethicone 200-200-20 mg/5 mL suspension 30 mL (30 mLs Oral Given 10/20/24 2159)     And   LIDOcaine viscous HCl 2% oral solution 15 mL (15 mLs Oral Given 10/20/24 2159)     Medical Decision Making  Physical exam, laboratory workup, radiographic workup are all unremarkable.  There is nothing acute or emergent going on at this time.  I advised the patient  follow up with her primary care physician as she still has a urinary tract infection, however she still has some for doxycycline left to take.  I advised her to inform her doctor of the chest pain issue so that she may be referred to a cardiologist.  What this patient really needs is good primary care follow-up to address all of her concerns.  There is nothing emergent at this time and no need for hospitalization or transfer.  All the results were discussed and all questions were answered.  The patient is slated to see her primary care physician on Tuesday.  It should be noted that the patient's blood pressure has been normal during her stay in the emergency room, however as soon as she sees the numbers on the machine, the patient gets extremely anxious causing her blood pressure to increase.  I do not believe that antihypertensives are necessarily warranted at this time without consultation from her primary care provider.  I did advise her to discuss this with her primary care provider, and antianxiety medication along with diet modification and exercise may be warranted in this setting, but there is no indication to treatment of her blood pressure given the fact that when calm, her blood pressure normalizes to of the normal range.    Amount and/or Complexity of Data Reviewed  Labs: ordered.  Radiology: ordered.    Risk  OTC drugs.  Prescription drug management.                                      Clinical Impression:  Final diagnoses:  [N34.2] Infective urethritis (Primary)          ED Disposition Condition    Discharge Stable          ED Prescriptions    None       Follow-up Information       Follow up With Specialties Details Why Contact Info    Roderick Fuchs, DO Family Medicine Go in 2 days  [address] [facility] [address]  [phone]               Venkat Sewell MD  10/20/24 8774

## 2024-10-22 ENCOUNTER — OFFICE VISIT (OUTPATIENT)
Dept: FAMILY MEDICINE | Facility: CLINIC | Age: 49
End: 2024-10-22
Payer: COMMERCIAL

## 2024-10-22 VITALS
DIASTOLIC BLOOD PRESSURE: 64 MMHG | HEART RATE: 105 BPM | WEIGHT: 153.63 LBS | BODY MASS INDEX: 24.69 KG/M2 | TEMPERATURE: 97 F | SYSTOLIC BLOOD PRESSURE: 100 MMHG | HEIGHT: 66 IN | OXYGEN SATURATION: 98 %

## 2024-10-22 DIAGNOSIS — E11.42 TYPE 2 DIABETES MELLITUS WITH DIABETIC POLYNEUROPATHY, WITHOUT LONG-TERM CURRENT USE OF INSULIN: Primary | ICD-10-CM

## 2024-10-22 DIAGNOSIS — N17.9 AKI (ACUTE KIDNEY INJURY): ICD-10-CM

## 2024-10-22 PROBLEM — I10 HYPERTENSION, ESSENTIAL: Status: ACTIVE | Noted: 2024-10-18

## 2024-10-22 RX ORDER — PREGABALIN 50 MG/1
50 CAPSULE ORAL 3 TIMES DAILY
Qty: 90 CAPSULE | Refills: 5 | Status: SHIPPED | OUTPATIENT
Start: 2024-10-22 | End: 2025-04-20

## 2024-10-22 NOTE — PROGRESS NOTES
"   Patient ID: [id number]     Chief Complaint: Establish Care    HPI:     Cici Ward is a 49 y.o. female here today for Establish Care. Sedation on gabapentin. Diabetes. Severe neuropathy.     Family history of type 1 diabetes.         -------------------------------------    Depression    Diabetes mellitus    Hypertension    Mixed hyperlipidemia    Neuropathy        Past Surgical History:   Procedure Laterality Date     SECTION      X2    FOOT SURGERY Left     HYSTERECTOMY      TONSILLECTOMY      TUBAL LIGATION  2000    WRIST SURGERY Right        Review of patient's allergies indicates:  No Known Allergies    Outpatient Medications Marked as Taking for the 10/22/24 encounter (Office Visit) with Roderick Fuchs,    Medication Sig Dispense Refill    BD VEO INSULIN SYRINGE UF 0.3 mL 31 gauge x 15/64" Syrg       doxycycline (VIBRA-TABS) 100 MG tablet Take 1 tablet (100 mg total) by mouth every 12 (twelve) hours. for 7 days 14 tablet 0    HYDROcodone-acetaminophen (NORCO) 5-325 mg per tablet Take 1 tablet by mouth every 6 (six) hours as needed for Pain. 20 tablet 0    insulin aspart U-100 (NOVOLOG) 100 unit/mL injection Inject 0-15 Units into the skin before meals and at bedtime as needed for High Blood Sugar. 10 mL 4    insulin syringes, disposable, 1 mL Syrg 14 Units by Misc.(Non-Drug; Combo Route) route every evening.  4    lancets (LANCETS,THIN) Misc Check CBGs qac/hs 30 each 4    lancets 30 gauge Misc       lisinopriL 10 MG tablet Take 1 tablet (10 mg total) by mouth once daily. 30 tablet 4    TRUEPLUS LANCETS 33 gauge Misc Apply topically 3 (three) times daily.      [DISCONTINUED] gabapentin (NEURONTIN) 300 MG capsule Take 2 capsules (600 mg total) by mouth 3 (three) times daily. 180 capsule 4       Social History     Socioeconomic History    Marital status:    Tobacco Use    Smoking status: Every Day     Current packs/day: 1.00     Average packs/day: 1 pack/day for 33.6 years (33.6 ttl " pk-yrs)     Types: Cigarettes     Start date: 4/4/1991    Smokeless tobacco: Never   Substance and Sexual Activity    Alcohol use: Never    Drug use: Never    Sexual activity: Yes     Partners: Male     Birth control/protection: See Surgical Hx     Comment: Hysterectomy   Social History Narrative    ** Merged History Encounter **          Social Drivers of Health     Financial Resource Strain: Low Risk  (10/21/2024)    Overall Financial Resource Strain (CARDIA)     Difficulty of Paying Living Expenses: Not very hard   Food Insecurity: No Food Insecurity (10/21/2024)    Hunger Vital Sign     Worried About Running Out of Food in the Last Year: Never true     Ran Out of Food in the Last Year: Never true   Transportation Needs: No Transportation Needs (10/14/2024)    TRANSPORTATION NEEDS     Transportation : No   Physical Activity: Inactive (10/21/2024)    Exercise Vital Sign     Days of Exercise per Week: 0 days     Minutes of Exercise per Session: 0 min   Stress: No Stress Concern Present (10/21/2024)    [employer] - Occupational Stress Questionnaire     Feeling of Stress : Only a little   Housing Stability: Low Risk  (10/21/2024)    Housing Stability Vital Sign     Unable to Pay for Housing in the Last Year: No     Homeless in the Last Year: No        Family History   Problem Relation Name Age of Onset    Arthritis Mother Kimi Rosenthal     Cancer Father Gary Jolly     Diabetes Father Gary Jolly     Hypertension Father Gary Rik     Stroke Father Gary Rik     Heart disease Paternal Grandfather Nasir Jolly     Cancer Paternal Grandmother Any Islas     Arthritis Sister Tanna Reinosoleigh     Asthma Sister Rosaline Sainzeneaux     Hypertension Sister Rosaline Sainzeneaux     Cancer Maternal Aunt Jessica      Depression Sister Alyssia Ben     Mental illness Sister Alyssia Ben     Diabetes Sister Tracie Burt     Hypertension Sister Tracie Burt      "Miscarriages / Stillbirths Daughter Madhuri Estrada         Patient Care Team:  Roderick Fuchs DO as PCP - General (Family Medicine)     Subjective:     Review of Systems   Constitutional:  Negative for chills and fever.   Respiratory:  Negative for shortness of breath.    Cardiovascular:  Negative for chest pain.   Gastrointestinal:  Negative for constipation and diarrhea.   Musculoskeletal:  Positive for myalgias.   Neurological:  Positive for tingling. Negative for headaches.   Psychiatric/Behavioral:  The patient does not have insomnia.        See HPI for details  All Other ROS: Negative except as stated in HPI.       Objective:     /64   Pulse 105   Temp 97.4 °F (36.3 °C) (Temporal)   Ht 5' 6" (1.676 m)   Wt 69.7 kg (153 lb 9.6 oz)   SpO2 98%   BMI 24.79 kg/m²     Physical Exam  Vitals reviewed.   Constitutional:       General: She is not in acute distress.     Appearance: Normal appearance.   Cardiovascular:      Rate and Rhythm: Normal rate and regular rhythm.      Heart sounds: No murmur heard.     No friction rub. No gallop.   Pulmonary:      Effort: No respiratory distress.      Breath sounds: No wheezing, rhonchi or rales.   Musculoskeletal:         General: No swelling, tenderness or deformity.      Right lower leg: No edema.      Left lower leg: No edema.   Skin:     General: Skin is warm and dry.      Findings: No lesion or rash.   Neurological:      General: No focal deficit present.      Mental Status: She is alert.   Psychiatric:         Mood and Affect: Mood normal.         Assessment/Plan:     1. Type 2 diabetes mellitus with diabetic polyneuropathy, without long-term current use of insulin  -     GAD65 Ab, Serum; Future; Expected date: 10/22/2024  -     pregabalin (LYRICA) 50 MG capsule; Take 1 capsule (50 mg total) by mouth 3 (three) times daily.  Dispense: 90 capsule; Refill: 5  -due to severity of patient's diabetes  I am unsure if the patient is a type 2 or possibly a type 1 " diabetic. Will test for antibodies to confirm whether she is type 1 or type 2. If type 2, will try to introduce oral medications once KERRIE has resolved.   -In the meantime, advised patient to continue her Novolog as prescribed.  -Will switch gabapentin to lyrica due to patient experiencing minimal relief on the gabapentin and experiencing significant sedation.   2. KERRIE (acute kidney injury)  -     Comprehensive Metabolic Panel; Future; Expected date: 10/22/2024          Follow up:     Follow up in about 2 weeks (around 11/5/2024) for Follow up diabetes. In addition to their scheduled follow up, the patient has also been instructed to follow up on as needed basis.

## 2024-10-23 ENCOUNTER — LAB VISIT (OUTPATIENT)
Dept: LAB | Facility: HOSPITAL | Age: 49
End: 2024-10-23
Attending: FAMILY MEDICINE
Payer: COMMERCIAL

## 2024-10-23 DIAGNOSIS — E11.42 TYPE 2 DIABETES MELLITUS WITH DIABETIC POLYNEUROPATHY, WITHOUT LONG-TERM CURRENT USE OF INSULIN: ICD-10-CM

## 2024-10-23 DIAGNOSIS — N17.9 AKI (ACUTE KIDNEY INJURY): ICD-10-CM

## 2024-10-23 LAB
ALBUMIN SERPL-MCNC: 3.5 G/DL (ref 3.5–5)
ALBUMIN/GLOB SERPL: 0.7 RATIO (ref 1.1–2)
ALP SERPL-CCNC: 179 UNIT/L (ref 40–150)
ALT SERPL-CCNC: 9 UNIT/L (ref 0–55)
ANION GAP SERPL CALC-SCNC: 15 MEQ/L
AST SERPL-CCNC: 18 UNIT/L (ref 5–34)
BACTERIA UR CULT: NORMAL
BILIRUB SERPL-MCNC: 0.3 MG/DL
BUN SERPL-MCNC: 20 MG/DL (ref 7–18.7)
CALCIUM SERPL-MCNC: 10.4 MG/DL (ref 8.4–10.2)
CHLORIDE SERPL-SCNC: 97 MMOL/L (ref 98–107)
CO2 SERPL-SCNC: 23 MMOL/L (ref 22–29)
CREAT SERPL-MCNC: 1.6 MG/DL (ref 0.55–1.02)
CREAT/UREA NIT SERPL: 13
GFR SERPLBLD CREATININE-BSD FMLA CKD-EPI: 40 ML/MIN/1.73/M2
GLOBULIN SER-MCNC: 5.3 GM/DL (ref 2.4–3.5)
GLUCOSE SERPL-MCNC: 288 MG/DL (ref 74–100)
POTASSIUM SERPL-SCNC: 3.8 MMOL/L (ref 3.5–5.1)
PROT SERPL-MCNC: 8.8 GM/DL (ref 6.4–8.3)
SODIUM SERPL-SCNC: 135 MMOL/L (ref 136–145)

## 2024-10-23 PROCEDURE — 86341 ISLET CELL ANTIBODY: CPT

## 2024-10-23 PROCEDURE — 80053 COMPREHEN METABOLIC PANEL: CPT

## 2024-10-23 PROCEDURE — 36415 COLL VENOUS BLD VENIPUNCTURE: CPT

## 2024-10-24 ENCOUNTER — PATIENT OUTREACH (OUTPATIENT)
Dept: ADMINISTRATIVE | Facility: CLINIC | Age: 49
End: 2024-10-24
Payer: COMMERCIAL

## 2024-10-24 NOTE — PROGRESS NOTES
C3 nurse attempted to contact Cici THOMPSON Ed for a TCC post hospital discharge follow up call. No answer, left voicemail with callback information. The patient had a scheduled Followup with her PCP, Roderick Fuchs DO on 10/22/24 at 1040. No messages routed at this time.

## 2024-10-26 LAB — GAD65 AB SER-SCNC: 0 NMOL/L

## 2024-11-11 ENCOUNTER — OFFICE VISIT (OUTPATIENT)
Dept: FAMILY MEDICINE | Facility: CLINIC | Age: 49
End: 2024-11-11
Payer: COMMERCIAL

## 2024-11-11 VITALS
SYSTOLIC BLOOD PRESSURE: 122 MMHG | TEMPERATURE: 98 F | OXYGEN SATURATION: 98 % | DIASTOLIC BLOOD PRESSURE: 68 MMHG | RESPIRATION RATE: 18 BRPM | BODY MASS INDEX: 24.81 KG/M2 | HEART RATE: 98 BPM | HEIGHT: 66 IN | WEIGHT: 154.38 LBS

## 2024-11-11 DIAGNOSIS — Z79.4 TYPE 2 DIABETES MELLITUS WITH DIABETIC POLYNEUROPATHY, WITH LONG-TERM CURRENT USE OF INSULIN: Primary | ICD-10-CM

## 2024-11-11 DIAGNOSIS — R31.9 HEMATURIA, UNSPECIFIED TYPE: ICD-10-CM

## 2024-11-11 DIAGNOSIS — E11.42 TYPE 2 DIABETES MELLITUS WITH DIABETIC POLYNEUROPATHY, WITH LONG-TERM CURRENT USE OF INSULIN: Primary | ICD-10-CM

## 2024-11-11 RX ORDER — BLOOD-GLUCOSE SENSOR
1 EACH MISCELLANEOUS
Qty: 3 EACH | Refills: 11 | Status: SHIPPED | OUTPATIENT
Start: 2024-11-11 | End: 2025-11-11

## 2024-11-11 RX ORDER — TIRZEPATIDE 2.5 MG/.5ML
2.5 INJECTION, SOLUTION SUBCUTANEOUS
Qty: 2 ML | Refills: 2 | Status: SHIPPED | OUTPATIENT
Start: 2024-11-11 | End: 2025-02-09

## 2024-11-11 RX ORDER — BLOOD-GLUCOSE,RECEIVER,CONT
1 EACH MISCELLANEOUS CONTINUOUS
Qty: 1 EACH | Refills: 0 | Status: SHIPPED | OUTPATIENT
Start: 2024-11-11 | End: 2025-11-11

## 2024-11-11 NOTE — PROGRESS NOTES
"   Patient ID: [id number]     Chief Complaint: Diabetes (Follow up) and Hematuria (" I think I passed a kidney stone this morning" )    HPI:     Cici Ward is a 49 y.o. female here today for Diabetes (Follow up) and Hematuria (" I think I passed a kidney stone this morning" ). Diabetes follow up.   -------------------------------------    Depression    Diabetes mellitus    Hypertension    Mixed hyperlipidemia    Neuropathy        Past Surgical History:   Procedure Laterality Date     SECTION      X2    FOOT SURGERY Left     HYSTERECTOMY      TONSILLECTOMY      TUBAL LIGATION  2000    WRIST SURGERY Right        Review of patient's allergies indicates:  No Known Allergies    Outpatient Medications Marked as Taking for the 24 encounter (Office Visit) with Roderick Fuchs, DO   Medication Sig Dispense Refill    BD VEO INSULIN SYRINGE UF 0.3 mL 31 gauge x 15/64" Syrg       insulin aspart U-100 (NOVOLOG) 100 unit/mL injection Inject 0-15 Units into the skin before meals and at bedtime as needed for High Blood Sugar. 10 mL 4    insulin syringes, disposable, 1 mL Syrg 14 Units by Misc.(Non-Drug; Combo Route) route every evening.  4    lancets (LANCETS,THIN) Misc Check CBGs qac/hs 30 each 4    lancets 30 gauge Misc       lisinopriL 10 MG tablet Take 1 tablet (10 mg total) by mouth once daily. 30 tablet 4    pregabalin (LYRICA) 50 MG capsule Take 1 capsule (50 mg total) by mouth 3 (three) times daily. 90 capsule 5    TRUEPLUS LANCETS 33 gauge Misc Apply topically 3 (three) times daily.         Social History     Socioeconomic History    Marital status:    Tobacco Use    Smoking status: Every Day     Current packs/day: 1.00     Average packs/day: 1 pack/day for 33.6 years (33.6 ttl pk-yrs)     Types: Cigarettes     Start date: 1991    Smokeless tobacco: Never   Substance and Sexual Activity    Alcohol use: Never    Drug use: Never    Sexual activity: Yes     Partners: Male     Birth " control/protection: See Surgical Hx     Comment: Hysterectomy   Social History Narrative    ** Merged History Encounter **          Social Drivers of Health     Financial Resource Strain: Low Risk  (10/21/2024)    Overall Financial Resource Strain (CARDIA)     Difficulty of Paying Living Expenses: Not very hard   Food Insecurity: No Food Insecurity (10/21/2024)    Hunger Vital Sign     Worried About Running Out of Food in the Last Year: Never true     Ran Out of Food in the Last Year: Never true   Transportation Needs: No Transportation Needs (10/14/2024)    TRANSPORTATION NEEDS     Transportation : No   Physical Activity: Inactive (10/21/2024)    Exercise Vital Sign     Days of Exercise per Week: 0 days     Minutes of Exercise per Session: 0 min   Stress: No Stress Concern Present (10/21/2024)    [place] [employer] - Occupational Stress Questionnaire     Feeling of Stress : Only a little   Housing Stability: Low Risk  (10/21/2024)    Housing Stability Vital Sign     Unable to Pay for Housing in the Last Year: No     Homeless in the Last Year: No        Family History   Problem Relation Name Age of Onset    Arthritis Mother Kimi Rosenthal     Cancer Father Gary Jolly     Diabetes Father Gary Jolly     Hypertension Father Gary Jolly     Stroke Father Gary Jolly     Heart disease Paternal Grandfather Nasir Jolly     Cancer Paternal Grandmother Any Islas     Arthritis Sister Tanna Feng     Asthma Sister Rosaline Karin     Hypertension Sister Rosaline Karin     Cancer Maternal Aunt Jessica Tarrant     Depression Sister Alyssia Bne     Mental illness Sister Alyssia Ben     Diabetes Sister Tracie Javed     Hypertension Sister Tracie Javed     Miscarriages / Stillbirths Daughter Madhuri Estrada         Patient Care Team:  Roderick Fuchs DO as PCP - General (Family Medicine)     Subjective:     Review of Systems   Constitutional:  Positive for weight  "loss.   Eyes:  Negative for blurred vision.   Cardiovascular:  Negative for chest pain.   Genitourinary:  Positive for hematuria.   Neurological:  Positive for dizziness and weakness. Negative for seizures.   Endo/Heme/Allergies:  Negative for polydipsia.   Psychiatric/Behavioral:  The patient is not nervous/anxious.        See HPI for details  All Other ROS: Negative except as stated in HPI.       Objective:     /68 (BP Location: Left arm, Patient Position: Sitting)   Pulse 98   Temp 97.7 °F (36.5 °C) (Temporal)   Resp 18   Ht 5' 6" (1.676 m)   Wt 70 kg (154 lb 6.4 oz)   SpO2 98%   BMI 24.92 kg/m²     Physical Exam  Vitals reviewed.   Constitutional:       General: She is not in acute distress.     Appearance: Normal appearance.   Cardiovascular:      Rate and Rhythm: Normal rate and regular rhythm.      Heart sounds: No murmur heard.     No friction rub. No gallop.   Pulmonary:      Effort: No respiratory distress.      Breath sounds: No wheezing, rhonchi or rales.   Musculoskeletal:         General: No swelling, tenderness or deformity.      Right lower leg: No edema.      Left lower leg: No edema.   Skin:     General: Skin is warm and dry.      Findings: No lesion or rash.   Neurological:      General: No focal deficit present.      Mental Status: She is alert.   Psychiatric:         Mood and Affect: Mood normal.         Assessment/Plan:     1. Type 2 diabetes mellitus with diabetic polyneuropathy, with long-term current use of insulin  -     Comprehensive Metabolic Panel; Future; Expected date: 11/11/2024  -     tirzepatide (MOUNJARO) 2.5 mg/0.5 mL PnIj; Inject 2.5 mg into the skin every 7 days.  Dispense: 2 mL; Refill: 2  -     blood-glucose meter,continuous (DEXCOM G7 ) Misc; 1 each by Misc.(Non-Drug; Combo Route) route continuous.  Dispense: 1 each; Refill: 0  -     blood-glucose sensor (DEXCOM G7 SENSOR) Daly; 1 each by Misc.(Non-Drug; Combo Route) route every 10 days.  Dispense: 3 " each; Refill: 11    2. Hematuria, unspecified type  -     Comprehensive Metabolic Panel; Future; Expected date: 11/11/2024  -     CBC Auto Differential; Future; Expected date: 11/11/2024  -     Urinalysis, Reflex to Urine Culture; Future; Expected date: 11/11/2024       Follow up:     Follow up in about 4 weeks (around 12/9/2024) for Follow up diabetes. In addition to their scheduled follow up, the patient has also been instructed to follow up on as needed basis.     Answers submitted by the patient for this visit:  Diabetes Questionnaire (Submitted on 11/10/2024)  Chief Complaint: Diabetes problem  Diabetes type: type 1  MedicAlert ID: No  Disease duration: 11 Years  fatigue: Yes  foot paresthesias: Yes  foot ulcerations: No  polyphagia: No  polyuria: No  visual change: Yes  Symptom course: improving  confusion: No  speech difficulty: No  hunger: Yes  mood changes: Yes  pallor: No  sleepiness: No  sweats: No  blackouts: No  hospitalization: No  nocturnal hypoglycemia: Yes  required assistance: Yes  required glucagon: No  CVA: No  heart disease: No  nephropathy: Yes  peripheral neuropathy: Yes  PVD: No  retinopathy: No  autonomic neuropathy: Yes  CAD risks: dyslipidemia, family history, hypertension, post-menopausal, sedentary lifestyle, stress, tobacco exposure, diabetes mellitus  Current treatments: diet, insulin injections  Treatment compliance: all of the time  Dose schedule: pre-breakfast, pre-lunch, pre-dinner, at bedtime  Given by: patient  Injection sites: abdominal wall  Home blood tests: 5+ x per day  Home urines: <1 x per month  Monitoring compliance: excellent  Blood glucose trend: fluctuating dramatically  Weight trend: decreasing steadily  Current diet: diabetic  Meal planning: none  Exercise: never  Dietitian visit: No  Eye exam current: No  Sees podiatrist: No

## 2024-11-18 ENCOUNTER — LAB VISIT (OUTPATIENT)
Dept: LAB | Facility: HOSPITAL | Age: 49
End: 2024-11-18
Attending: FAMILY MEDICINE
Payer: COMMERCIAL

## 2024-11-18 DIAGNOSIS — R31.9 HEMATURIA, UNSPECIFIED TYPE: ICD-10-CM

## 2024-11-18 DIAGNOSIS — Z79.4 TYPE 2 DIABETES MELLITUS WITH DIABETIC POLYNEUROPATHY, WITH LONG-TERM CURRENT USE OF INSULIN: ICD-10-CM

## 2024-11-18 DIAGNOSIS — E11.42 TYPE 2 DIABETES MELLITUS WITH DIABETIC POLYNEUROPATHY, WITH LONG-TERM CURRENT USE OF INSULIN: ICD-10-CM

## 2024-11-18 LAB
ALBUMIN SERPL-MCNC: 3.8 G/DL (ref 3.5–5)
ALBUMIN/GLOB SERPL: 0.9 RATIO (ref 1.1–2)
ALP SERPL-CCNC: 157 UNIT/L (ref 40–150)
ALT SERPL-CCNC: 22 UNIT/L (ref 0–55)
ANION GAP SERPL CALC-SCNC: 9 MEQ/L
AST SERPL-CCNC: 12 UNIT/L (ref 5–34)
BACTERIA #/AREA URNS AUTO: ABNORMAL /HPF
BASOPHILS # BLD AUTO: 0.07 X10(3)/MCL
BASOPHILS NFR BLD AUTO: 1 %
BILIRUB SERPL-MCNC: 0.4 MG/DL
BILIRUB UR QL STRIP.AUTO: NEGATIVE
BUN SERPL-MCNC: 33 MG/DL (ref 7–18.7)
CALCIUM SERPL-MCNC: 9.7 MG/DL (ref 8.4–10.2)
CHLORIDE SERPL-SCNC: 101 MMOL/L (ref 98–107)
CLARITY UR: CLEAR
CO2 SERPL-SCNC: 24 MMOL/L (ref 22–29)
COLOR UR AUTO: YELLOW
CREAT SERPL-MCNC: 1.37 MG/DL (ref 0.55–1.02)
CREAT/UREA NIT SERPL: 24
EOSINOPHIL # BLD AUTO: 0.27 X10(3)/MCL (ref 0–0.9)
EOSINOPHIL NFR BLD AUTO: 3.9 %
ERYTHROCYTE [DISTWIDTH] IN BLOOD BY AUTOMATED COUNT: 15.3 % (ref 11.5–17)
GFR SERPLBLD CREATININE-BSD FMLA CKD-EPI: 48 ML/MIN/1.73/M2
GLOBULIN SER-MCNC: 4.4 GM/DL (ref 2.4–3.5)
GLUCOSE SERPL-MCNC: 332 MG/DL (ref 74–100)
GLUCOSE UR QL STRIP: ABNORMAL
HCT VFR BLD AUTO: 33 % (ref 37–47)
HGB BLD-MCNC: 10.5 G/DL (ref 12–16)
HGB UR QL STRIP: NEGATIVE
IMM GRANULOCYTES # BLD AUTO: 0.02 X10(3)/MCL (ref 0–0.04)
IMM GRANULOCYTES NFR BLD AUTO: 0.3 %
KETONES UR QL STRIP: NEGATIVE
LEUKOCYTE ESTERASE UR QL STRIP: ABNORMAL
LYMPHOCYTES # BLD AUTO: 2.52 X10(3)/MCL (ref 0.6–4.6)
LYMPHOCYTES NFR BLD AUTO: 36.2 %
MCH RBC QN AUTO: 27.9 PG (ref 27–31)
MCHC RBC AUTO-ENTMCNC: 31.8 G/DL (ref 33–36)
MCV RBC AUTO: 87.5 FL (ref 80–94)
MONOCYTES # BLD AUTO: 0.52 X10(3)/MCL (ref 0.1–1.3)
MONOCYTES NFR BLD AUTO: 7.5 %
NEUTROPHILS # BLD AUTO: 3.57 X10(3)/MCL (ref 2.1–9.2)
NEUTROPHILS NFR BLD AUTO: 51.1 %
NITRITE UR QL STRIP: NEGATIVE
NRBC BLD AUTO-RTO: 0 %
PH UR STRIP: 6 [PH]
PLATELET # BLD AUTO: 324 X10(3)/MCL (ref 130–400)
PMV BLD AUTO: 10.8 FL (ref 7.4–10.4)
POTASSIUM SERPL-SCNC: 4.3 MMOL/L (ref 3.5–5.1)
PROT SERPL-MCNC: 8.2 GM/DL (ref 6.4–8.3)
PROT UR QL STRIP: ABNORMAL
RBC # BLD AUTO: 3.77 X10(6)/MCL (ref 4.2–5.4)
RBC #/AREA URNS AUTO: ABNORMAL /HPF
SODIUM SERPL-SCNC: 134 MMOL/L (ref 136–145)
SP GR UR STRIP.AUTO: >=1.03 (ref 1–1.03)
SQUAMOUS #/AREA URNS AUTO: ABNORMAL /HPF
UROBILINOGEN UR STRIP-ACNC: 0.2
WBC # BLD AUTO: 6.97 X10(3)/MCL (ref 4.5–11.5)
WBC #/AREA URNS AUTO: ABNORMAL /HPF

## 2024-11-18 PROCEDURE — 87086 URINE CULTURE/COLONY COUNT: CPT

## 2024-11-18 PROCEDURE — 80053 COMPREHEN METABOLIC PANEL: CPT

## 2024-11-18 PROCEDURE — 81003 URINALYSIS AUTO W/O SCOPE: CPT

## 2024-11-18 PROCEDURE — 36415 COLL VENOUS BLD VENIPUNCTURE: CPT

## 2024-11-18 PROCEDURE — 85025 COMPLETE CBC W/AUTO DIFF WBC: CPT

## 2024-11-20 ENCOUNTER — TELEPHONE (OUTPATIENT)
Dept: FAMILY MEDICINE | Facility: CLINIC | Age: 49
End: 2024-11-20
Payer: COMMERCIAL

## 2024-11-20 LAB — BACTERIA UR CULT: ABNORMAL

## 2024-11-20 NOTE — TELEPHONE ENCOUNTER
----- Message from Michelle Willard NP sent at 11/20/2024 12:06 PM CST -----  Please inform patient of lab results.     1. UTI- Does the patient have any symptoms? I saw she came in on 11/11 with hematuria?     2. Anemia is stable.     3. GFR has improved some,but BUN is increased. Dehydration.     Thanks for all you do,   Michelle

## 2024-11-20 NOTE — TELEPHONE ENCOUNTER
Patient is feeling better states every once in awhile it is painful to urinate. She passed a stone.   Patient given results

## 2024-12-18 DIAGNOSIS — E11.42 TYPE 2 DIABETES MELLITUS WITH DIABETIC POLYNEUROPATHY, WITH LONG-TERM CURRENT USE OF INSULIN: ICD-10-CM

## 2024-12-18 DIAGNOSIS — Z79.4 TYPE 2 DIABETES MELLITUS WITH DIABETIC POLYNEUROPATHY, WITH LONG-TERM CURRENT USE OF INSULIN: ICD-10-CM

## 2024-12-19 RX ORDER — BLOOD-GLUCOSE SENSOR
1 EACH MISCELLANEOUS
Qty: 3 EACH | Refills: 11 | Status: SHIPPED | OUTPATIENT
Start: 2024-12-19 | End: 2025-12-19

## 2024-12-19 RX ORDER — TIRZEPATIDE 2.5 MG/.5ML
2.5 INJECTION, SOLUTION SUBCUTANEOUS
Qty: 2 ML | Refills: 2 | Status: SHIPPED | OUTPATIENT
Start: 2024-12-19 | End: 2025-03-19

## 2024-12-26 ENCOUNTER — TELEPHONE (OUTPATIENT)
Dept: FAMILY MEDICINE | Facility: CLINIC | Age: 49
End: 2024-12-26
Payer: COMMERCIAL

## 2024-12-26 DIAGNOSIS — Z79.4 TYPE 2 DIABETES MELLITUS WITH DIABETIC POLYNEUROPATHY, WITH LONG-TERM CURRENT USE OF INSULIN: Primary | ICD-10-CM

## 2024-12-26 DIAGNOSIS — E11.42 TYPE 2 DIABETES MELLITUS WITH DIABETIC POLYNEUROPATHY, WITH LONG-TERM CURRENT USE OF INSULIN: Primary | ICD-10-CM

## 2024-12-26 RX ORDER — SYRING-NEEDL,DISP,INSUL,0.3 ML 31GX15/64"
1 SYRINGE, EMPTY DISPOSABLE MISCELLANEOUS DAILY PRN
Qty: 90 EACH | Refills: 2 | Status: SHIPPED | OUTPATIENT
Start: 2024-12-26

## 2025-01-30 ENCOUNTER — OFFICE VISIT (OUTPATIENT)
Dept: FAMILY MEDICINE | Facility: CLINIC | Age: 50
End: 2025-01-30
Payer: COMMERCIAL

## 2025-01-30 ENCOUNTER — HOSPITAL ENCOUNTER (OUTPATIENT)
Dept: RADIOLOGY | Facility: HOSPITAL | Age: 50
Discharge: HOME | End: 2025-01-30
Attending: FAMILY MEDICINE
Payer: COMMERCIAL

## 2025-01-30 ENCOUNTER — PATIENT MESSAGE (OUTPATIENT)
Dept: ADMINISTRATIVE | Facility: OTHER | Age: 50
End: 2025-01-30
Payer: COMMERCIAL

## 2025-01-30 ENCOUNTER — PATIENT MESSAGE (OUTPATIENT)
Dept: ADMINISTRATIVE | Facility: HOSPITAL | Age: 50
End: 2025-01-30
Payer: COMMERCIAL

## 2025-01-30 VITALS
DIASTOLIC BLOOD PRESSURE: 84 MMHG | HEART RATE: 98 BPM | HEIGHT: 66 IN | OXYGEN SATURATION: 98 % | BODY MASS INDEX: 23.08 KG/M2 | TEMPERATURE: 98 F | SYSTOLIC BLOOD PRESSURE: 154 MMHG | WEIGHT: 143.63 LBS | RESPIRATION RATE: 20 BRPM

## 2025-01-30 DIAGNOSIS — E11.42 TYPE 2 DIABETES MELLITUS WITH DIABETIC POLYNEUROPATHY, WITH LONG-TERM CURRENT USE OF INSULIN: Primary | ICD-10-CM

## 2025-01-30 DIAGNOSIS — R92.8 ABNORMAL MAMMOGRAM: ICD-10-CM

## 2025-01-30 DIAGNOSIS — Z12.11 COLON CANCER SCREENING: ICD-10-CM

## 2025-01-30 DIAGNOSIS — I10 HYPERTENSION, ESSENTIAL: ICD-10-CM

## 2025-01-30 DIAGNOSIS — R11.0 NAUSEA: ICD-10-CM

## 2025-01-30 DIAGNOSIS — K59.03 DRUG INDUCED CONSTIPATION: ICD-10-CM

## 2025-01-30 DIAGNOSIS — N18.31 STAGE 3A CHRONIC KIDNEY DISEASE: ICD-10-CM

## 2025-01-30 DIAGNOSIS — Z79.4 TYPE 2 DIABETES MELLITUS WITH DIABETIC POLYNEUROPATHY, WITH LONG-TERM CURRENT USE OF INSULIN: Primary | ICD-10-CM

## 2025-01-30 DIAGNOSIS — Z12.31 ENCOUNTER FOR SCREENING MAMMOGRAM FOR BREAST CANCER: ICD-10-CM

## 2025-01-30 PROBLEM — E86.0 DEHYDRATION: Status: RESOLVED | Noted: 2024-10-14 | Resolved: 2025-01-30

## 2025-01-30 PROBLEM — N17.9 AKI (ACUTE KIDNEY INJURY): Status: RESOLVED | Noted: 2024-10-14 | Resolved: 2025-01-30

## 2025-01-30 PROBLEM — N13.30 HYDRONEPHROSIS OF RIGHT KIDNEY: Status: RESOLVED | Noted: 2024-10-14 | Resolved: 2025-01-30

## 2025-01-30 PROCEDURE — 3044F HG A1C LEVEL LT 7.0%: CPT | Mod: CPTII,,,

## 2025-01-30 PROCEDURE — 3077F SYST BP >= 140 MM HG: CPT | Mod: CPTII,,,

## 2025-01-30 PROCEDURE — 3008F BODY MASS INDEX DOCD: CPT | Mod: CPTII,,,

## 2025-01-30 PROCEDURE — G2211 COMPLEX E/M VISIT ADD ON: HCPCS | Mod: ,,,

## 2025-01-30 PROCEDURE — 99215 OFFICE O/P EST HI 40 MIN: CPT | Mod: ,,,

## 2025-01-30 PROCEDURE — 1160F RVW MEDS BY RX/DR IN RCRD: CPT | Mod: CPTII,,,

## 2025-01-30 PROCEDURE — 1159F MED LIST DOCD IN RCRD: CPT | Mod: CPTII,,,

## 2025-01-30 PROCEDURE — 3079F DIAST BP 80-89 MM HG: CPT | Mod: CPTII,,,

## 2025-01-30 RX ORDER — BLOOD-GLUCOSE SENSOR
1 EACH MISCELLANEOUS
Qty: 5 EACH | Refills: 5 | Status: SHIPPED | OUTPATIENT
Start: 2025-01-30 | End: 2026-01-30

## 2025-01-30 RX ORDER — TIRZEPATIDE 2.5 MG/.5ML
2.5 INJECTION, SOLUTION SUBCUTANEOUS
Qty: 2 ML | Refills: 2 | Status: SHIPPED | OUTPATIENT
Start: 2025-01-30 | End: 2025-04-30

## 2025-01-30 RX ORDER — ONDANSETRON 4 MG/1
4 TABLET, ORALLY DISINTEGRATING ORAL 2 TIMES DAILY
Qty: 60 TABLET | Refills: 0 | Status: SHIPPED | OUTPATIENT
Start: 2025-01-30 | End: 2025-03-01

## 2025-01-30 RX ORDER — ROSUVASTATIN CALCIUM 10 MG/1
10 TABLET, COATED ORAL DAILY
Qty: 90 TABLET | Refills: 3 | Status: SHIPPED | OUTPATIENT
Start: 2025-01-30 | End: 2026-01-30

## 2025-01-30 NOTE — ASSESSMENT & PLAN NOTE
Lab Results   Component Value Date    EGFRNORACEVR 55 01/30/2025    EGFRNORACEVR 48 11/18/2024    EGFRNORACEVR >60 03/22/2023     Follow renoprotective measures including Renal Diet (reduce intake of nuts, peanut butter, milk, cheese, dried beans, peas) and Low Sodium Diet (less than 2 grams per day).  Avoid NSAIDs (Aleve, Mobic, Celebrex, Ibuprofen, Advil, Toradol and Diclofenac). May take Tylenol as needed for headache/pain.  Control DM with goal A1C <7. BP goal <130/80. LDL goal < 100.  Stay well hydrated. Avoid alcohol and soda. Limit tea and coffee.  Smoking Cessation recommended.

## 2025-01-30 NOTE — PROGRESS NOTES
Patient ID: [id number]     Chief Complaint: Diabetes    HPI:     Cici Ward is a 49 y.o. female here today for a follow up.    History of Present Illness    CHIEF COMPLAINT:  Patient presents today for diabetes follow up    DIABETES:  She was diagnosed with Type 2 Diabetes in [id number] with initial A1C of 17. Most recent A1C is 6.8. She currently takes Mounjaro 2.5mg with reported nausea during the first three days after administration (Saturday through Monday at 2 AM). She takes insulin before meals and at night based on glucose readings, with no insulin administered if glucose is under 150. She initiated insulin therapy in October with no prior insulin use. She previously tried Metformin but could not tolerate it, and has also tried Farxiga and Tradjenta.    DIABETIC NEUROPATHY:  She has had diabetic neuropathy since  with progressive loss of sensation extending to the top of her cap. She takes Lyrica at night only due to its sedating effects.    GI CONCERNS:  She reports chronic constipation with bowel movements occurring as infrequently as every two weeks, accompanied by episodes of violent nausea when urgency for bowel movements occurs. Her diet is significantly restricted, consisting primarily of broccoli and peas.    MEDICAL HISTORY:  She had a history of hydronephrosis and kidney stone requiring hospitalization.    SOCIAL HISTORY:  She is a current smoker who reports enjoying smoking and expresses no interest in smoking cessation.    Past Medical History:   Diagnosis Date    KERRIE (acute kidney injury) 10/14/2024    Depression     Diabetes mellitus     Hydronephrosis of right kidney 10/14/2024    Hypertension     Mixed hyperlipidemia     Neuropathy         Past Surgical History:   Procedure Laterality Date     SECTION      X2    FOOT SURGERY Left     HYSTERECTOMY      TONSILLECTOMY      TUBAL LIGATION  2000    WRIST SURGERY Right         Social History     Socioeconomic History    Marital  "status:    Tobacco Use    Smoking status: Every Day     Current packs/day: 1.00     Average packs/day: 1 pack/day for 34.8 years (34.3 ttl pk-yrs)     Types: Cigarettes     Start date: 4/4/1991    Smokeless tobacco: Never   Substance and Sexual Activity    Alcohol use: Never    Drug use: Never    Sexual activity: Yes     Partners: Male     Birth control/protection: See Surgical Hx     Comment: Hysterectomy   Social History Narrative    ** Merged History Encounter **          Social Drivers of Health     Financial Resource Strain: Low Risk  (10/21/2024)    Overall Financial Resource Strain (CARDIA)     Difficulty of Paying Living Expenses: Not very hard   Food Insecurity: No Food Insecurity (10/21/2024)    Hunger Vital Sign     Worried About Running Out of Food in the Last Year: Never true     Ran Out of Food in the Last Year: Never true   Transportation Needs: No Transportation Needs (10/14/2024)    TRANSPORTATION NEEDS     Transportation : No   Physical Activity: Inactive (10/21/2024)    Exercise Vital Sign     Days of Exercise per Week: 0 days     Minutes of Exercise per Session: 0 min   Stress: No Stress Concern Present (10/21/2024)    [employer] [place] of Occupational Health - Occupational Stress Questionnaire     Feeling of Stress : Only a little   Housing Stability: Low Risk  (10/21/2024)    Housing Stability Vital Sign     Unable to Pay for Housing in the Last Year: No     Homeless in the Last Year: No        Current Outpatient Medications   Medication Instructions    BD VEO INSULIN SYRINGE UF 0.3 mL 31 gauge x 15/64" Syrg 1 each, Subcutaneous, Daily PRN    blood-glucose sensor (FREESTYLE ANETA 3 SENSOR) [place] 1 each, Misc.(Non-Drug; Combo Route), Every 14 days    insulin aspart U-100 (NOVOLOG) 0-15 Units, Subcutaneous, Before meals & nightly PRN    lisinopriL 10 mg, Oral, Daily    MOUNJARO 2.5 mg, Subcutaneous, Every 7 days    ondansetron (ZOFRAN-ODT) 4 mg, Oral, 2 times daily    pregabalin (LYRICA) 50 " "mg, Oral, 3 times daily    rosuvastatin (CRESTOR) 10 mg, Oral, Daily       Review of patient's allergies indicates:  No Known Allergies     Patient Care Team:  Roderick Fuchs DO as PCP - General (Family Medicine)     Subjective:     Review of Systems    12 point review of systems conducted, negative except as stated in the history of present illness. See HPI for details.    Objective:     Visit Vitals  BP (!) 154/84 (BP Location: Right arm, Patient Position: Sitting)   Pulse 98   Temp 97.6 °F (36.4 °C)   Resp 20   Ht 5' 6" (1.676 m)   Wt 65.1 kg (143 lb 9.6 oz)   SpO2 98%   BMI 23.18 kg/m²     Physical Exam  Vitals and nursing note reviewed.   Constitutional:       General: She is not in acute distress.     Appearance: She is not ill-appearing.   HENT:      Head: Normocephalic and atraumatic.      Mouth/Throat:      Mouth: Mucous membranes are moist.      Pharynx: Oropharynx is clear.   Eyes:      General: No scleral icterus.     Extraocular Movements: Extraocular movements intact.      Conjunctiva/sclera: Conjunctivae normal.      Pupils: Pupils are equal, round, and reactive to light.   Neck:      Vascular: No carotid bruit.   Cardiovascular:      Rate and Rhythm: Normal rate and regular rhythm.      Heart sounds: No murmur heard.     No friction rub. No gallop.   Pulmonary:      Effort: Pulmonary effort is normal. No respiratory distress.      Breath sounds: Normal breath sounds. No wheezing, rhonchi or rales.   Abdominal:      General: Abdomen is flat. Bowel sounds are normal. There is no distension.      Palpations: Abdomen is soft. There is no mass.      Tenderness: There is no abdominal tenderness.   Musculoskeletal:         General: Normal range of motion.      Cervical back: Normal range of motion and neck supple.   Skin:     General: Skin is warm and dry.   Neurological:      General: No focal deficit present.      Mental Status: She is alert.      Gait: Gait abnormal (patient uses cane for " assistance.).   Psychiatric:         Mood and Affect: Mood normal.       Labs Reviewed:     Chemistry:  Lab Results   Component Value Date     01/30/2025    K 3.6 01/30/2025    BUN 29.0 (H) 01/30/2025    CREATININE 1.22 (H) 01/30/2025    EGFRNORACEVR 55 01/30/2025    GLUCOSE 188 (H) 01/30/2025    CALCIUM 10.0 01/30/2025    ALKPHOS 140 01/30/2025    LABPROT 8.6 (H) 01/30/2025    ALBUMIN 4.1 01/30/2025    AST 25 01/30/2025    ALT 37 01/30/2025    MG 1.60 10/17/2024    PHOS 2.2 (L) 10/15/2024    TSH 2.494 10/20/2024        Lab Results   Component Value Date    HGBA1C 6.8 01/30/2025        Hematology:  Lab Results   Component Value Date    WBC 6.97 11/18/2024    HGB 10.5 (L) 11/18/2024    HCT 33.0 (L) 11/18/2024     11/18/2024     Lipid Panel:  Lab Results   Component Value Date    CHOL 184 01/30/2025    HDL 48 01/30/2025    .00 01/30/2025    TRIG 100 01/30/2025    TOTALCHOLEST 4 01/30/2025     Assessment:       ICD-10-CM ICD-9-CM   1. Type 2 diabetes mellitus with diabetic polyneuropathy, with long-term current use of insulin  E11.42 250.60    Z79.4 357.2     V58.67   2. Hypertension, essential  I10 401.9   3. Nausea  R11.0 787.02   4. Drug induced constipation  K59.03 564.09     E980.5   5. Stage 3a chronic kidney disease  N18.31 585.3   6. Abnormal mammogram  R92.8 793.80   7. Colon cancer screening  Z12.11 V76.51      Plan:     1. Type 2 diabetes mellitus with diabetic polyneuropathy, with long-term current use of insulin  Assessment & Plan:  Foot Exam Declined today.       Orders:  -     Diabetic Eye Screening Photo; Future  -     Ambulatory referral/consult to Nephrology; Future; Expected date: 01/30/2025  -     rosuvastatin (CRESTOR) 10 MG tablet; Take 1 tablet (10 mg total) by mouth once daily.  Dispense: 90 tablet; Refill: 3  -     Ambulatory referral/consult to Diabetes Education; Future; Expected date: 01/30/2025  -     tirzepatide (MOUNJARO) 2.5 mg/0.5 mL PnIj; Inject 2.5 mg into the skin  every 7 days.  Dispense: 2 mL; Refill: 2  -     Hemoglobin A1C; Future; Expected date: 04/07/2025  -     Comprehensive Metabolic Panel; Future; Expected date: 04/07/2025  -     Lipid Panel; Future; Expected date: 04/07/2025  -     blood-glucose sensor (FREESTYLE ANETA 3 SENSOR) Daly; 1 each by Misc.(Non-Drug; Combo Route) route every 14 (fourteen) days.  Dispense: 5 each; Refill: 5    2. Hypertension, essential    3. Nausea  -     ondansetron (ZOFRAN-ODT) 4 MG TbDL; Take 1 tablet (4 mg total) by mouth 2 (two) times daily.  Dispense: 60 tablet; Refill: 0    4. Drug induced constipation    5. Stage 3a chronic kidney disease  Assessment & Plan:  Lab Results   Component Value Date    EGFRNORACEVR 55 01/30/2025    EGFRNORACEVR 48 11/18/2024    EGFRNORACEVR >60 03/22/2023     Follow renoprotective measures including Renal Diet (reduce intake of nuts, peanut butter, milk, cheese, dried beans, peas) and Low Sodium Diet (less than 2 grams per day).  Avoid NSAIDs (Aleve, Mobic, Celebrex, Ibuprofen, Advil, Toradol and Diclofenac). May take Tylenol as needed for headache/pain.  Control DM with goal A1C <7. BP goal <130/80. LDL goal < 100.  Stay well hydrated. Avoid alcohol and soda. Limit tea and coffee.  Smoking Cessation recommended.    Orders:  -     Ambulatory referral/consult to Nephrology; Future; Expected date: 01/30/2025  -     Comprehensive Metabolic Panel; Future; Expected date: 04/07/2025    6. Abnormal mammogram  -     Mammo Digital Diagnostic Bilat with Fox; Future; Expected date: 01/30/2025  -     US Breast Right Limited; Future; Expected date: 01/30/2025    7. Colon cancer screening  -     Ambulatory referral/consult to Gastroenterology; Future; Expected date: 01/30/2025       Assessment & Plan    IMPRESSION:  - Type 2 diabetes: A1C improved to 6.8%  - CKD stage 3 present  - 10-year cardiovascular risk score of 26% warrants statin therapy  - Continued current diabetes management with Mounjaro and insulin  -  Switched from Dexcom to Freestyle Victoria 3 for glucose monitoring due to cost concerns  - Addressed constipation related to GLP-1 use  - Monitored blood pressure to ensure lisinopril efficacy    DIABETES:  - Discussed the impact of pain and stress on glucose levels.  - Continued Mounjaro 2.5 mg weekly.  - Continued insulin (dosage based on glucose levels).  - Started Zofran for nausea associated with Mounjaro.  - Freestyle Victoria 3 continuous glucose monitor ordered.  - Referred to Diabetes education in [place].    CONSTIPATION:  - Educated on the importance of fiber intake for bowel regularity.  - Recommend increasing fiber intake through diet (e.g., broccoli, peas, spinach, cabbage) or supplements.  - Recommend increasing water intake to help with constipation and blood pressure management.  - Continued Miralax daily for constipation.  - Started OTC fiber supplements (e.g., Metamucil) and probiotics.    CARDIOVASCULAR RISK:  - Explained the connection between smoking and increased risk of stroke/heart attack, especially with comorbidities.  - Patient to consider smoking cessation to reduce cardiovascular risks.  - Started statin for cardiovascular risk reduction, to be taken at night.    HYPERTENSION:  - Patient to monitor blood pressure 2 times daily: once 1 hour after taking medications and once in the evening.  - Recommend increasing water intake to help with constipation and blood pressure management.  - Continued lisinopril for blood pressure management.  - Follow up in 2 weeks for blood pressure check.    DIABETIC NEUROPATHY:  - Continued Lyrica for diabetic neuropathy pain, to be taken at night as needed.    CHRONIC KIDNEY DISEASE:  - Microalbumin urine test ordered (results pending).  - Referred to Nephrology for CKD stage 3 management.    COLONOSCOPY REFERRAL:  - Referred to Dr. Garcia in [place] for colonoscopy.    DIABETIC EYE EXAM REFERRAL:  - Referred to Dr. Gonzalez's office for diabetic eye  exam.    FOLLOW UP:  - Patient to bring blood pressure logs and glucose reports to next appointment.  - Follow up in 3 months with fasting labs prior to appointment.  - Contact the office if unable to obtain Freestyle Victoria or if any issues arise before the 2-week appointment.         Follow up in about 2 weeks (around 2/13/2025) for HTN Follow Up. In addition to their scheduled follow up, the patient has also been instructed to follow up on as needed basis.     This note was generated with the assistance of ambient listening technology. Verbal consent was obtained by the patient and accompanying visitor(s) for the recording of patient appointment to facilitate this note. I attest to having reviewed and edited the generated note for accuracy, though some syntax or spelling errors may persist. Please contact the author of this note for any clarification.    I spent a total of 44 minutes on the day of the visit.This includes face to face time and non-face to face time preparing to see the patient (eg, review of tests), obtaining and/or reviewing separately obtained history, documenting clinical information in the electronic or other health record, independently interpreting results and communicating results to the patient/family/caregiver, or care coordinator.        Michelle Willard NP

## 2025-02-03 ENCOUNTER — TELEPHONE (OUTPATIENT)
Dept: FAMILY MEDICINE | Facility: CLINIC | Age: 50
End: 2025-02-03
Payer: COMMERCIAL

## 2025-02-03 NOTE — TELEPHONE ENCOUNTER
----- Message from Michelle Willard NP sent at 1/31/2025 11:02 AM CST -----  Please inform patient of lab results.     1. Urine microalbumin and urine creatinine are elevated most likely to uncontrolled blood sugars.     Thanks for all you do,   Michelle

## 2025-02-11 DIAGNOSIS — Z79.4 TYPE 2 DIABETES MELLITUS WITH DIABETIC POLYNEUROPATHY, WITH LONG-TERM CURRENT USE OF INSULIN: Primary | ICD-10-CM

## 2025-02-11 DIAGNOSIS — E11.42 TYPE 2 DIABETES MELLITUS WITH DIABETIC POLYNEUROPATHY, WITH LONG-TERM CURRENT USE OF INSULIN: Primary | ICD-10-CM

## 2025-02-11 DIAGNOSIS — N18.31 STAGE 3A CHRONIC KIDNEY DISEASE: ICD-10-CM

## 2025-05-25 DIAGNOSIS — E11.42 TYPE 2 DIABETES MELLITUS WITH DIABETIC POLYNEUROPATHY, WITH LONG-TERM CURRENT USE OF INSULIN: ICD-10-CM

## 2025-05-25 DIAGNOSIS — Z79.4 TYPE 2 DIABETES MELLITUS WITH DIABETIC POLYNEUROPATHY, WITH LONG-TERM CURRENT USE OF INSULIN: ICD-10-CM

## 2025-05-27 RX ORDER — BLOOD-GLUCOSE SENSOR
1 EACH MISCELLANEOUS
Qty: 5 EACH | Refills: 11 | Status: SHIPPED | OUTPATIENT
Start: 2025-05-27 | End: 2026-05-27

## 2025-06-03 DIAGNOSIS — E11.42 TYPE 2 DIABETES MELLITUS WITH DIABETIC POLYNEUROPATHY, WITH LONG-TERM CURRENT USE OF INSULIN: ICD-10-CM

## 2025-06-03 DIAGNOSIS — Z79.4 TYPE 2 DIABETES MELLITUS WITH DIABETIC POLYNEUROPATHY, WITH LONG-TERM CURRENT USE OF INSULIN: ICD-10-CM

## 2025-07-14 DIAGNOSIS — E11.42 TYPE 2 DIABETES MELLITUS WITH DIABETIC POLYNEUROPATHY, WITH LONG-TERM CURRENT USE OF INSULIN: ICD-10-CM

## 2025-07-14 DIAGNOSIS — Z79.4 TYPE 2 DIABETES MELLITUS WITH DIABETIC POLYNEUROPATHY, WITH LONG-TERM CURRENT USE OF INSULIN: ICD-10-CM
